# Patient Record
Sex: MALE | Race: WHITE | NOT HISPANIC OR LATINO | Employment: FULL TIME | URBAN - METROPOLITAN AREA
[De-identification: names, ages, dates, MRNs, and addresses within clinical notes are randomized per-mention and may not be internally consistent; named-entity substitution may affect disease eponyms.]

---

## 2018-10-18 ENCOUNTER — OFFICE VISIT (OUTPATIENT)
Dept: FAMILY MEDICINE | Facility: CLINIC | Age: 28
End: 2018-10-18
Payer: COMMERCIAL

## 2018-10-18 VITALS
OXYGEN SATURATION: 95 % | HEIGHT: 73 IN | WEIGHT: 315 LBS | HEART RATE: 89 BPM | DIASTOLIC BLOOD PRESSURE: 86 MMHG | SYSTOLIC BLOOD PRESSURE: 128 MMHG | BODY MASS INDEX: 41.75 KG/M2

## 2018-10-18 DIAGNOSIS — R40.0 DAYTIME SOMNOLENCE: ICD-10-CM

## 2018-10-18 DIAGNOSIS — Z00.00 ROUTINE GENERAL MEDICAL EXAMINATION AT A HEALTH CARE FACILITY: Primary | ICD-10-CM

## 2018-10-18 DIAGNOSIS — L91.8 SKIN TAG: ICD-10-CM

## 2018-10-18 DIAGNOSIS — R06.83 SNORING: ICD-10-CM

## 2018-10-18 DIAGNOSIS — E66.01 MORBID OBESITY (H): ICD-10-CM

## 2018-10-18 DIAGNOSIS — Z13.6 CARDIOVASCULAR SCREENING; LDL GOAL LESS THAN 160: ICD-10-CM

## 2018-10-18 LAB
BASOPHILS # BLD AUTO: 0 10E9/L (ref 0–0.2)
BASOPHILS NFR BLD AUTO: 0.1 %
DIFFERENTIAL METHOD BLD: ABNORMAL
EOSINOPHIL # BLD AUTO: 0.2 10E9/L (ref 0–0.7)
EOSINOPHIL NFR BLD AUTO: 2 %
ERYTHROCYTE [DISTWIDTH] IN BLOOD BY AUTOMATED COUNT: 14 % (ref 10–15)
HCT VFR BLD AUTO: 43.9 % (ref 40–53)
HGB BLD-MCNC: 14.8 G/DL (ref 13.3–17.7)
LYMPHOCYTES # BLD AUTO: 3.6 10E9/L (ref 0.8–5.3)
LYMPHOCYTES NFR BLD AUTO: 30 %
MCH RBC QN AUTO: 28.7 PG (ref 26.5–33)
MCHC RBC AUTO-ENTMCNC: 33.7 G/DL (ref 31.5–36.5)
MCV RBC AUTO: 85 FL (ref 78–100)
MONOCYTES # BLD AUTO: 1 10E9/L (ref 0–1.3)
MONOCYTES NFR BLD AUTO: 8 %
NEUTROPHILS # BLD AUTO: 7.1 10E9/L (ref 1.6–8.3)
NEUTROPHILS NFR BLD AUTO: 59.9 %
PLATELET # BLD AUTO: 229 10E9/L (ref 150–450)
RBC # BLD AUTO: 5.15 10E12/L (ref 4.4–5.9)
WBC # BLD AUTO: 11.9 10E9/L (ref 4–11)

## 2018-10-18 PROCEDURE — 99385 PREV VISIT NEW AGE 18-39: CPT | Performed by: PHYSICIAN ASSISTANT

## 2018-10-18 PROCEDURE — 80053 COMPREHEN METABOLIC PANEL: CPT | Performed by: PHYSICIAN ASSISTANT

## 2018-10-18 PROCEDURE — 84443 ASSAY THYROID STIM HORMONE: CPT | Performed by: PHYSICIAN ASSISTANT

## 2018-10-18 PROCEDURE — 36415 COLL VENOUS BLD VENIPUNCTURE: CPT | Performed by: PHYSICIAN ASSISTANT

## 2018-10-18 PROCEDURE — 83721 ASSAY OF BLOOD LIPOPROTEIN: CPT | Performed by: PHYSICIAN ASSISTANT

## 2018-10-18 PROCEDURE — 85025 COMPLETE CBC W/AUTO DIFF WBC: CPT | Performed by: PHYSICIAN ASSISTANT

## 2018-10-18 NOTE — MR AVS SNAPSHOT
After Visit Summary   10/18/2018    Zach Hunter    MRN: [id number]           Patient Information     Date Of Birth          1990        Visit Information        Provider Department      10/18/2018 4:00 PM John Richards PA-C [facility]        Today's Diagnoses     Routine general medical examination at a health care facility    -  1    Snoring        Daytime somnolence        CARDIOVASCULAR SCREENING; LDL GOAL LESS THAN 160        Skin tag          Care Instructions      Preventive Health Recommendations  Male Ages 26 - 39    Yearly exam:             See your health care provider every year in order to  o   Review health changes.   o   Discuss preventive care.    o   Review your medicines if your doctor has prescribed any.    You should be tested each year for STDs (sexually transmitted diseases), if you re at risk.     After age 35, talk to your provider about cholesterol testing. If you are at risk for heart disease, have your cholesterol tested at least every 5 years.     If you are at risk for diabetes, you should have a diabetes test (fasting glucose).  Shots: Get a flu shot each year. Get a tetanus shot every 10 years.     Nutrition:    Eat at least 5 servings of fruits and vegetables daily.     Eat whole-grain bread, whole-wheat pasta and brown rice instead of white grains and rice.     Get adequate Calcium and Vitamin D.     Lifestyle    Exercise for at least 150 minutes a week (30 minutes a day, 5 days a week). This will help you control your weight and prevent disease.     Limit alcohol to one drink per day.     No smoking.     Wear sunscreen to prevent skin cancer.     See your dentist every six months for an exam and cleaning.             Follow-ups after your visit        Additional Services     DERMATOLOGY REFERRAL       Your provider has referred you to: ANISA: [facility] [phone]  [email or web address]    Please be aware  that coverage of these services is subject to the terms and limitations of your health insurance plan.  Call member services at your health plan with any benefit or coverage questions.      Please bring the following with you to your appointment:    (1) Any X-Rays, CTs or MRIs which have been performed.  Contact the facility where they were done to arrange for  prior to your scheduled appointment.    (2) List of current medications  (3) This referral request   (4) Any documents/labs given to you for this referral            SLEEP EVALUATION & MANAGEMENT REFERRAL - [facility] [phone]  (Age 18 and up)       Please be aware that coverage of these services is subject to the terms and limitations of your health insurance plan.  Call member services at your health plan with any benefit or coverage questions.      Please bring the following to your appointment:    >>   List of current medications   >>   This referral request   >>   Any documents/labs given to you for this referral                      Future tests that were ordered for you today     Open Future Orders        Priority Expected Expires Ordered    SLEEP EVALUATION & MANAGEMENT REFERRAL - [facility] [phone]  (Age 18 and up) Routine  10/18/2019 10/18/2018            Who to contact     If you have questions or need follow up information about today's clinic visit or your schedule please contact [facility] directly at [phone].  Normal or non-critical lab and imaging results will be communicated to you by MyChart, letter or phone within 4 business days after the clinic has received the results. If you do not hear from us within 7 days, please contact the clinic through MyChart or phone. If you have a critical or abnormal lab result, we will notify you by phone as soon as possible.  Submit refill requests through [employer] or call your pharmacy and they will forward the  "refill request to us. Please allow 3 business days for your refill to be completed.          Additional Information About Your Visit        daPulsehart Information     [employer] gives you secure access to your electronic health record. If you see a primary care provider, you can also send messages to your care team and make appointments. If you have questions, please call your primary care clinic.  If you do not have a primary care provider, please call [phone] and they will assist you.        Care EveryWhere ID     This is your Care EveryWhere ID. This could be used by other organizations to access your [facility] medical records  [id number]        Your Vitals Were     Pulse Height Pulse Oximetry BMI (Body Mass Index)          89 6' 0.5\" (1.842 m) 95% 50.36 kg/m2         Blood Pressure from Last 3 Encounters:   10/18/18 128/86    Weight from Last 3 Encounters:   10/18/18 (!) 376 lb 8 oz (170.8 kg)              We Performed the Following     CBC with platelets differential     Comprehensive metabolic panel     DERMATOLOGY REFERRAL     LDL cholesterol direct     TSH with free T4 reflex        Primary Care Provider    None Specified       No primary provider on file.        Equal Access to Services     [facility]: Hadii lindsay Hutchinson, waaxda elicia, qaybta kaalshabana gloria, kailee cervantes . So [facility] [phone].    ATENCIÓN: Si habla español, tiene a chase disposición servicios gratuitos de asistencia lingüística. Llame al [phone].    We comply with applicable federal civil rights laws and [place] laws. We do not discriminate on the basis of race, color, national origin, age, disability, sex, sexual orientation, or gender identity.            Thank you!     Thank you for choosing [facility]  for your care. Our goal is always to provide you with excellent care. Hearing back from our patients is one way we can continue to improve our services. Please take a few " minutes to complete the written survey that you may receive in the mail after your visit with us. Thank you!             Your Updated Medication List - Protect others around you: Learn how to safely use, store and throw away your medicines at www.disposemymeds.org.      Notice  As of 10/18/2018  4:41 PM    You have not been prescribed any medications.

## 2018-10-18 NOTE — NURSING NOTE
"Chief Complaint   Patient presents with     Physical     establish care, discuss some gi issues, occasional blood in stool, previously has taken miralax with improved sx,      /89  Pulse 89  Ht 6' 0.5\" (1.842 m)  Wt (!) 376 lb 8 oz (170.8 kg)  SpO2 95%  BMI 50.36 kg/m2 Estimated body mass index is 50.36 kg/(m^2) as calculated from the following:    Height as of this encounter: 6' 0.5\" (1.842 m).    Weight as of this encounter: 376 lb 8 oz (170.8 kg).        Health Maintenance due pending provider review:  NONE    n/a    Sherita Hahn CMA  "

## 2018-10-18 NOTE — PROGRESS NOTES
SUBJECTIVE:   CC: Zach Hunter is an 28 year old male who presents for preventative health visit.     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  He recently moved to [facility] from [place].  He has really started to change some lifestyle choices.  He has quit smoking since this summer.  He has been exercising and eating much better.  He is feeling better than he has in while.  He has lost some weight, not sure how much, but clothes fitting different.      He is a bit concerns about sleep apnea, does snore and daytime energy is not great.  Would like to get checked for that.      Numerous skin tags around neck line that do bother him.  Interested if these can be removed.    Hx of bright red blood in stool, did have GI study done.  Was planning on getting colonscopy, but never did.  miralax does help.  Has not had too much of an issues lately with his diet changes and exercising.          Today's PHQ-2 Score:   PHQ-2 ( 1999 [employer]) 10/17/2018   Q1: Little interest or pleasure in doing things 0   Q2: Feeling down, depressed or hopeless 0   PHQ-2 Score 0   Q1: Little interest or pleasure in doing things Not at all   Q2: Feeling down, depressed or hopeless Not at all   PHQ-2 Score 0       Abuse: Current or Past(Physical, Sexual or Emotional)- NO  Do you feel safe in your environment - YES    Social History   Substance Use Topics     Smoking status: Former Smoker     Packs/day: 0.50     Years: 10.00     Types: Cigarettes, Cigars, Hookah     Start date: 8/1/2007     Quit date: 6/1/2018     Smokeless  "tobacco: Never Used     Alcohol use Yes      Comment: Casual, 1-2 per week with dinner     Alcohol Use 10/17/2018   If you drink alcohol do you typically have greater than 3 drinks per day OR greater than 7 drinks per week? No   No flowsheet data found.    Last PSA: No results found for: PSA    Reviewed orders with patient. Reviewed health maintenance and updated orders accordingly - Yes  BP Readings from Last 3 Encounters:   10/18/18 128/86    Wt Readings from Last 3 Encounters:   10/18/18 (!) 376 lb 8 oz (170.8 kg)                    Reviewed and updated as needed this visit by clinical staff  Tobacco  Allergies  Meds         Reviewed and updated as needed this visit by Provider            Review of Systems  CONSTITUTIONAL: NEGATIVE for fever, chills, change in weight  INTEGUMENTARY/SKIN: skin tags  EYES: NEGATIVE for vision changes or irritation  ENT: NEGATIVE for ear, mouth and throat problems  RESP: NEGATIVE for significant cough or SOB  CV: NEGATIVE for chest pain, palpitations or peripheral edema  GI: NEGATIVE for nausea, abdominal pain, heartburn, or change in bowel habits   male: negative for dysuria, hematuria, decreased urinary stream, erectile dysfunction, urethral discharge  MUSCULOSKELETAL: NEGATIVE for significant arthralgias or myalgia  NEURO: NEGATIVE for weakness, dizziness or paresthesias  PSYCHIATRIC: NEGATIVE for changes in mood or affect    OBJECTIVE:   /86  Pulse 89  Ht 6' 0.5\" (1.842 m)  Wt (!) 376 lb 8 oz (170.8 kg)  SpO2 95%  BMI 50.36 kg/m2    Physical Exam  GENERAL: alert, no distress and obese  EYES: Eyes grossly normal to inspection, PERRL and conjunctivae and sclerae normal  HENT: ear canals and TM's normal, nose and mouth without ulcers or lesions  NECK: no adenopathy, no asymmetry, masses, or scars and thyroid normal to palpation  RESP: lungs clear to auscultation - no rales, rhonchi or wheezes  CV: regular rate and rhythm, normal S1 S2, no S3 or S4, no murmur, click " "or rub, no peripheral edema and peripheral pulses strong  ABDOMEN: obese, non tender.  Difficult exam secondary to habitus.    MS: no gross musculoskeletal defects noted, no edema  SKIN: numerous skin tags around neck line, some rather large.  NEURO: Normal strength and tone, mentation intact and speech normal  PSYCH: mentation appears normal, affect normal/bright    Diagnostic Test Results:  none     ASSESSMENT/PLAN:   1. Routine general medical examination at a health care facility  Encouraged by some of the changes he has already made.  Will get some baseline labs.  - Comprehensive metabolic panel  - CBC with platelets differential  - TSH with free T4 reflex    2. Snoring  Risk factors/  - SLEEP EVALUATION & MANAGEMENT REFERRAL - [facility] [phone]  (Age 18 and up); Future    3. Morbid obesity (H)  Improving, just needs to keep up with those changes.  He feels confident.    4. Daytime somnolence    - SLEEP EVALUATION & MANAGEMENT REFERRAL - [facility] [phone]  (Age 18 and up); Future    5. Skin tag    - DERMATOLOGY REFERRAL    6. CARDIOVASCULAR SCREENING; LDL GOAL LESS THAN 160    - LDL cholesterol direct    COUNSELING:   Reviewed preventive health counseling, as reflected in patient instructions       Regular exercise       Healthy diet/nutrition    BP Readings from Last 1 Encounters:   10/18/18 128/86     Estimated body mass index is 50.36 kg/(m^2) as calculated from the following:    Height as of this encounter: 6' 0.5\" (1.842 m).    Weight as of this encounter: 376 lb 8 oz (170.8 kg).    BP Screening:   Last 3 BP Readings:    BP Readings from Last 3 Encounters:   10/18/18 128/86       The following was recommended to the patient:  Re-screen BP within a year and recommended lifestyle modifications  Weight management plan: Discussed healthy diet and exercise guidelines and patient will follow up in 12 months in clinic to re-evaluate.     " reports that he quit smoking about 4 months ago. His smoking use included Cigarettes, Cigars, and Hookah. He started smoking about 11 years ago. He has a 5.00 pack-year smoking history. He has never used smokeless tobacco.      Counseling Resources:  ATP IV Guidelines  Pooled Cohorts Equation Calculator  FRAX Risk Assessment  ICSI Preventive Guidelines  Dietary Guidelines for Americans, 2010  USDA's MyPlate  ASA Prophylaxis  Lung CA Screening    John Richards PA-C  [facility]

## 2018-10-19 PROBLEM — Z13.6 CARDIOVASCULAR SCREENING; LDL GOAL LESS THAN 160: Status: ACTIVE | Noted: 2018-10-19

## 2018-10-19 LAB
ALBUMIN SERPL-MCNC: 4.1 G/DL (ref 3.4–5)
ALP SERPL-CCNC: 71 U/L (ref 40–150)
ALT SERPL W P-5'-P-CCNC: 73 U/L (ref 0–70)
ANION GAP SERPL CALCULATED.3IONS-SCNC: 8 MMOL/L (ref 3–14)
AST SERPL W P-5'-P-CCNC: 33 U/L (ref 0–45)
BILIRUB SERPL-MCNC: 0.3 MG/DL (ref 0.2–1.3)
BUN SERPL-MCNC: 16 MG/DL (ref 7–30)
CALCIUM SERPL-MCNC: 8.7 MG/DL (ref 8.5–10.1)
CHLORIDE SERPL-SCNC: 104 MMOL/L (ref 94–109)
CO2 SERPL-SCNC: 27 MMOL/L (ref 20–32)
CREAT SERPL-MCNC: 1.04 MG/DL (ref 0.66–1.25)
GFR SERPL CREATININE-BSD FRML MDRD: 85 ML/MIN/1.7M2
GLUCOSE SERPL-MCNC: 71 MG/DL (ref 70–99)
LDLC SERPL DIRECT ASSAY-MCNC: 116 MG/DL
POTASSIUM SERPL-SCNC: 3.8 MMOL/L (ref 3.4–5.3)
PROT SERPL-MCNC: 8.1 G/DL (ref 6.8–8.8)
SODIUM SERPL-SCNC: 139 MMOL/L (ref 133–144)
TSH SERPL DL<=0.005 MIU/L-ACNC: 1.58 MU/L (ref 0.4–4)

## 2018-10-22 NOTE — PROGRESS NOTES
Dear Zach    Your test results are attached, feel free to contact me via [employer] .    Overall, your labs look good.  Cholesterol we want to see below 130, which you are.  Very slight bump in ALT.  This is a liver enzyme.  This is very small and is most likely to having extra weight on you.  This will improve as you continue to get healthy.  I would not change anything based on these results.  Keep up the good work.      Jorge Richards PA-C

## 2019-02-18 ENCOUNTER — OFFICE VISIT (OUTPATIENT)
Dept: DERMATOLOGY | Facility: CLINIC | Age: 29
End: 2019-02-18
Payer: COMMERCIAL

## 2019-02-18 ENCOUNTER — OFFICE VISIT (OUTPATIENT)
Dept: SLEEP MEDICINE | Facility: CLINIC | Age: 29
End: 2019-02-18
Payer: COMMERCIAL

## 2019-02-18 VITALS — HEART RATE: 103 BPM | OXYGEN SATURATION: 97 % | SYSTOLIC BLOOD PRESSURE: 122 MMHG | DIASTOLIC BLOOD PRESSURE: 76 MMHG

## 2019-02-18 VITALS
HEIGHT: 73 IN | RESPIRATION RATE: 18 BRPM | WEIGHT: 315 LBS | OXYGEN SATURATION: 95 % | DIASTOLIC BLOOD PRESSURE: 85 MMHG | SYSTOLIC BLOOD PRESSURE: 124 MMHG | BODY MASS INDEX: 41.75 KG/M2 | HEART RATE: 90 BPM | TEMPERATURE: 98.1 F

## 2019-02-18 DIAGNOSIS — L91.8 INFLAMED SKIN TAG: Primary | ICD-10-CM

## 2019-02-18 DIAGNOSIS — R29.818 SUSPECTED SLEEP APNEA: Primary | ICD-10-CM

## 2019-02-18 DIAGNOSIS — R06.83 SNORING: ICD-10-CM

## 2019-02-18 DIAGNOSIS — G47.19 EXCESSIVE DAYTIME SLEEPINESS: ICD-10-CM

## 2019-02-18 DIAGNOSIS — R06.81 WITNESSED APNEIC SPELLS: ICD-10-CM

## 2019-02-18 PROCEDURE — 11201 RMVL SKIN TAGS EA ADDL 10: CPT | Performed by: PHYSICIAN ASSISTANT

## 2019-02-18 PROCEDURE — 99203 OFFICE O/P NEW LOW 30 MIN: CPT | Mod: 25 | Performed by: INTERNAL MEDICINE

## 2019-02-18 PROCEDURE — 99213 OFFICE O/P EST LOW 20 MIN: CPT | Mod: 25 | Performed by: PHYSICIAN ASSISTANT

## 2019-02-18 PROCEDURE — 11200 RMVL SKIN TAGS UP TO&INC 15: CPT | Performed by: PHYSICIAN ASSISTANT

## 2019-02-18 ASSESSMENT — MIFFLIN-ST. JEOR: SCORE: 2756.62

## 2019-02-18 NOTE — LETTER
2019         RE: Zach Hunter  [address]  [place] [address]        Dear Colleague,    Thank you for referring your patient, Zach Hunter, to the [facility]. Please see a copy of my visit note below.    HPI:  Zach Hunter is a 28 year old male patient here today for irritated growths on neckline and left eyelid .  Patient states this has been present for a while.  Patient reports the following symptoms: lesion on eyelid feels heavy and within visual field .  Patient reports the following previous treatments: none.  Patient reports the following modifying factors: none.  Associated symptoms: none. Had similar lesions snipped off in the past.  Patient has no other skin complaints today.  Remainder of the HPI, Meds, PMH, Allergies, FH, and SH was reviewed in chart.    Pertinent Hx:   No personal or family history of skin cancer    History reviewed. No pertinent past medical history.    History reviewed. No pertinent surgical history.     Family History   Problem Relation Age of Onset     Colon Cancer Father         Haven't been in contact since ~; unsure of current status       Social History     Socioeconomic History     Marital status: Single     Spouse name: Not on file     Number of children: Not on file     Years of education: Not on file     Highest education level: Not on file   Social Needs     Financial resource strain: Not on file     Food insecurity - worry: Not on file     Food insecurity - inability: Not on file     Transportation needs - medical: Not on file     Transportation needs - non-medical: Not on file   Occupational History     Not on file   Tobacco Use     Smoking status: Former Smoker     Packs/day: 0.50     Years: 10.00     Pack years: 5.00     Types: Cigarettes, Cigars, Hookah     Start date: 2007     Last attempt to quit: 2018     Years since quittin.7     Smokeless tobacco: Never Used   Substance and Sexual  Activity     Alcohol use: Yes     Comment: Casual, 1-2 per week with dinner     Drug use: Yes     Types: Marijuana     Comment: Recreational use     Sexual activity: Yes     Partners: Female     Birth control/protection: IUD   Other Topics Concern     Parent/sibling w/ CABG, MI or angioplasty before 65F 55M? No   Social History Narrative     Not on file       No outpatient encounter medications on file as of 2/18/2019.     No facility-administered encounter medications on file as of 2/18/2019.        Review Of Systems:  Skin: As above  Eyes: negative  Ears/Nose/Throat: negative  Respiratory: No shortness of breath, dyspnea on exertion, cough, or hemoptysis  Cardiovascular: negative  Gastrointestinal: negative  Genitourinary: negative  Musculoskeletal: negative  Neurologic: negative  Psychiatric: negative  Hematologic/Lymphatic/Immunologic: negative  Endocrine: negative      Objective:     /76   Pulse 103   SpO2 97%   Eyes: Conjunctivae/lids: Normal   ENT: Lips:  Normal  MSK: Normal  Cardiovascular: Peripheral edema none  Pulm: Breathing Normal  Neuro/Psych: Orientation: Normal; Mood/Affect: Normal, NAD, WDWN  Pt accompanied by: self  Following areas examined: face, neck, hands  Corcoran skin type:i   Findings:  Inflamed flesh-colored smooth pedunculated papules on neck and left superior eyelid  Assessment and Plan:  1) inflamed skin tags x 20  Disc etiology and course  SNIP REMOVAL: After consent, anesthesia with LEC and prep, snip excision performed.  No complications and routine wound care.  May grow back and will get a scar. Wound care directions given.    Proper skin care from [facility]:    -Eliminate harsh soaps as they strip the natural oils from the skin, often resulting in dry itchy skin ( i.e. Dial, Zest, Danish Spring)  -Use mild soaps such as Cetaphil or Dove Sensitive Skin in the shower. You do not need to use soap on arms, legs, and trunk every time you shower unless visibly soiled.    -Avoid hot or cold showers.  -After showering, lightly dry off and apply moisturizing within 2-3 minutes. This will help trap moisture in the skin.   -Aggressive use of a moisturizer at least 1-2 times a day to the entire body (including -Vanicream, Cetaphil, Aquaphor or Cerave) and moisturize hands after every washing.  -We recommend using moisturizers that come in a tub that needs to be scooped out, not a pump. This has more of an oil base. It will hold moisture in your skin much better than a water base moisturizer. The above recommended are non-pore clogging.               Follow up in prn      Again, thank you for allowing me to participate in the care of your patient.        Sincerely,        Leena Mackenzie PA-C

## 2019-02-18 NOTE — PROGRESS NOTES
Sleep Consultation:    Date on this visit: 2/18/2019    Zach Hunter is sent by No ref. provider found for a sleep consultation regarding possible sleep apnea.    Primary Physician: John Richards     Chief complaints: snoring, witnessed apneas     Presenting History:     Zach Hunter reports nightly snoring and gasping for last 5 years.     Zach does snore every night. Snoring is loud and can be heard outside the room. Patient does have a regular bed partner. There is report of snoring, gasping and snorting.  He does have witnessed apneas. They never sleep separately.  Patient sleeps on his side. He has frequent morning dry mouth, denies no morning headaches and restless legs. Zach denies any bruxism, sleep walking, sleep talking, sleep paralysis, cataplexy and hypnogogic/hypnopompic hallucinations.     Zach goes to sleep at 11:00 PM during the week. He wakes up at 7:00 AM without an alarm. He falls asleep in 20 minutes.  Zach denies difficulty falling asleep.  He wakes up 2-4 times a night for 15 minutes before falling back to sleep.  Zach wakes up to go to the bathroom and uncertain reasons.  On weekends, Zach goes to sleep at 12:00 AM.  He wakes up at 9:00 AM without an alarm. He falls asleep in 20 minutes.  Patient gets an average of 7 hours of sleep per night.     Patient does use electronics in bed.     Zach does not do shift work.      Zach denies difficulty breathing through his nose.      Patient's [place] Sleepiness score 11/24 consistent with mild daytime sleepiness.      Zach naps 0 times per week. He takes some inadvertant naps.  He denies closing eyes, dozing and falling asleep while driving. Patient was counseled on the importance of driving while alert, to pull over if drowsy, or nap before getting into the vehicle if sleepy.      He uses 4 cups/day of coffee. Last caffeine intake is usually before 2 pm.    Allergies:    No Known  Allergies    Medications:    No current outpatient medications on file.       Problem List:  Patient Active Problem List    Diagnosis Date Noted     CARDIOVASCULAR SCREENING; LDL GOAL LESS THAN 160 10/19/2018     Priority: Medium     Morbid obesity (H) 10/18/2018     Priority: Medium        Past Medical/Surgical History:    - Nothing significant.     Social History:  Social History     Socioeconomic History     Marital status: Single     Spouse name: Not on file     Number of children: Not on file     Years of education: Not on file     Highest education level: Not on file   Social Needs     Financial resource strain: Not on file     Food insecurity - worry: Not on file     Food insecurity - inability: Not on file     Transportation needs - medical: Not on file     Transportation needs - non-medical: Not on file   Occupational History     Not on file   Tobacco Use     Smoking status: Former Smoker     Packs/day: 0.50     Years: 10.00     Pack years: 5.00     Types: Cigarettes, Cigars, Hookah     Start date: 2007     Last attempt to quit: 2018     Years since quittin.7     Smokeless tobacco: Never Used   Substance and Sexual Activity     Alcohol use: Yes     Comment: Casual, 1-2 per week with dinner     Drug use: Yes     Types: Marijuana     Comment: Recreational use     Sexual activity: Yes     Partners: Female     Birth control/protection: IUD   Other Topics Concern     Parent/sibling w/ CABG, MI or angioplasty before 65F 55M? No   Social History Narrative     Not on file       Family History:  Family History   Problem Relation Age of Onset     Colon Cancer Father         Haven't been in contact since ~; unsure of current status       Review of Systems:  A complete review of systems reviewed by me is negative with the exeption of what has been mentioned in the history of present illness.  CONSTITUTIONAL: NEGATIVE for weight gain/loss, fever, chills, sweats or night sweats, drug allergies.  EYES:  "NEGATIVE for changes in vision, blind spots, double vision.  ENT: NEGATIVE for ear pain, sore throat, sinus pain, post-nasal drip, runny nose, bloody nose  CARDIAC: NEGATIVE for fast heartbeats or fluttering in chest, chest pain or pressure, breathlessness when lying flat, swollen legs or swollen feet.  NEUROLOGIC: NEGATIVE headaches, weakness or numbness in the arms or legs.  DERMATOLOGIC: NEGATIVE for rashes, new moles or change in mole(s)  PULMONARY:  POSITIVE for  SOB with activity  GASTROINTESTINAL:  POSITIVE for  loose or watery stools  GENITOURINARY: NEGATIVE for pain during urination, blood in urine, urinating more frequently than usual, irregular menstrual periods.  MUSCULOSKELETAL: NEGATIVE for muscle pain, bone or joint pain, swollen joints.  ENDOCRINE: NEGATIVE for increased thirst or urination, diabetes.  LYMPHATIC: NEGATIVE for swollen lymph nodes, lumps or bumps in the breasts or nipple discharge.    Physical Examination:  Vitals: /85   Pulse 90   Temp 98.1  F (36.7  C) (Oral)   Resp 18   Ht 1.854 m (6' 1\")   Wt (!) 173.3 kg (382 lb)   SpO2 95%   BMI 50.40 kg/m    BMI= Body mass index is 50.4 kg/m .    Neck Cir (cm): 54 cm    [place] Total Score 2/18/2019   Total score - [place] 11       SERG Total Score: 17 (02/18/19 0956)    GENERAL APPEARANCE: healthy, alert and no distress  EYES: Eyes grossly normal to inspection, PERRL and conjunctivae and sclerae normal  HENT: nose and mouth without ulcers or lesions, oropharynx crowded, uvula elongated, soft palate dependent and tongue base enlarged  NECK: no adenopathy, no asymmetry, masses, or scars and thyroid normal to palpation  RESP: lungs clear to auscultation - no rales, rhonchi or wheezes  CV: regular rates and rhythm, normal S1 S2, no S3 or S4 and no murmur, click or rub  ABDOMEN: soft, nontender, without hepatosplenomegaly or masses and obese  MS: extremities normal- no gross deformities noted  NEURO: Normal strength and tone, mentation " intact and speech normal  PSYCH: mentation appears normal and affect normal/bright  Mallampati Class: IV.  Tonsillar Stage: 1  hidden by pillars.    Impression/Plan:    1. Probable obstructive sleep apnea  2. Snoring   3. Witnessed apneas  4. Sleepiness  5. Obesity     - Patient, 29 yo male with BMI 50, neck circumference 54 cm, with history of snoring, witnessed apneas and daytime sleepiness is at a high risk for sleep apnea. He doesnot have significant medical comorbidity. An overnight sleep study is recommended for assessment of sleep apnea and treatment determination.     Plan:    1. Home sleep apnea test       Literature provided regarding sleep apnea.      He will follow up with me in approximately two weeks after his sleep study has been competed to review the results and discuss plan of care.       Polysomnography & HST reviewed.  Limitations of HST reviewed.   Obstructive sleep apnea reviewed.  Complications of untreated sleep apnea were reviewed.    I spent a total of 30 minutes with patient with more than 50% in counseling     Dr. Haseeb Wu     CC: No ref. provider found

## 2019-02-18 NOTE — NURSING NOTE
"Chief Complaint   Patient presents with     Sleep Problem     Loud snoring, concern of sleep apnea, waking up 3-4 times per night       Initial /85   Pulse 90   Temp 98.1  F (36.7  C) (Oral)   Resp 18   Ht 1.854 m (6' 1\")   Wt (!) 173.3 kg (382 lb)   SpO2 95%   BMI 50.40 kg/m   Estimated body mass index is 50.4 kg/m  as calculated from the following:    Height as of this encounter: 1.854 m (6' 1\").    Weight as of this encounter: 173.3 kg (382 lb).    Medication Reconciliation: complete    Neck circumference: 21 inches / 53 centimeters.    ESS 11    Emmanuelle Allen MA      "

## 2019-02-18 NOTE — PATIENT INSTRUCTIONS
Your BMI is Body mass index is 50.4 kg/m .  Weight management is a personal decision.  If you are interested in exploring weight loss strategies, the following discussion covers the approaches that may be successful. Body mass index (BMI) is one way to tell whether you are at a healthy weight, overweight, or obese. It measures your weight in relation to your height.  A BMI of 18.5 to 24.9 is in the healthy range. A person with a BMI of 25 to 29.9 is considered overweight, and someone with a BMI of 30 or greater is considered obese. More than two-thirds of American adults are considered overweight or obese.  Being overweight or obese increases the risk for further weight gain. Excess weight may lead to heart disease and diabetes.  Creating and following plans for healthy eating and physical activity may help you improve your health.  Weight control is part of healthy lifestyle and includes exercise, emotional health, and healthy eating habits. Careful eating habits lifelong are the mainstay of weight control. Though there are significant health benefits from weight loss, long-term weight loss with diet alone may be very difficult to achieve- studies show long-term success with dietary management in less than 10% of people. Attaining a healthy weight may be especially difficult to achieve in those with severe obesity. In some cases, medications, devices and surgical management might be considered.  What can you do?  If you are overweight or obese and are interested in methods for weight loss, you should discuss this with your provider.     Consider reducing daily calorie intake by 500 calories.     Keep a food journal.     Avoiding skipping meals, consider cutting portions instead.    Diet combined with exercise helps maintain muscle while optimizing fat loss. Strength training is particularly important for building and maintaining muscle mass. Exercise helps reduce stress, increase energy, and improves fitness.  Increasing exercise without diet control, however, may not burn enough calories to loose weight.       Start walking three days a week 10-20 minutes at a time    Work towards walking thirty minutes five days a week     Eventually, increase the speed of your walking for 1-2 minutes at time    In addition, we recommend that you review healthy lifestyles and methods for weight loss available through the National Institutes of Health patient information sites:  http://win.niddk.nih.gov/publications/index.htm    And look into health and wellness programs that may be available through your health insurance provider, employer, local community center, or margarita club.    Weight management plan: Patient was referred to their PCP to discuss a diet and exercise plan.

## 2019-02-18 NOTE — PATIENT INSTRUCTIONS
Proper skin care from [facility]:    -Eliminate harsh soaps as they strip the natural oils from the skin, often resulting in dry itchy skin ( i.e. Dial, Zest, [place])  -Use mild soaps such as Cetaphil or Dove Sensitive Skin in the shower. You do not need to use soap on arms, legs, and trunk every time you shower unless visibly soiled.   -Avoid hot or cold showers.  -After showering, lightly dry off and apply moisturizing within 2-3 minutes. This will help trap moisture in the skin.   -Aggressive use of a moisturizer at least 1-2 times a day to the entire body (including -Vanicream, Cetaphil, Aquaphor or Cerave) and moisturize hands after every washing.  -We recommend using moisturizers that come in a tub that needs to be scooped out, not a pump. This has more of an oil base. It will hold moisture in your skin much better than a water base moisturizer. The above recommended are non-pore clogging.           Wear a sunscreen with at least SPF 30 on your face, ears, neck and V of the chest daily. Wear sunscreen on other areas of the body if those areas are exposed to the sun throughout the day. Sunscreens can contain physical and/or chemical blockers. Physical blockers are less likely to clog pores, these include zinc oxide and titanium dioxide. Reapply every two hour and after swimming. Sunscreen examples include Neutrogena, CeraVe, Blue Lizard, Elta MD and many others.    UV radiation  UVA radiation remains constant throughout the day and throughout the year. It is a longer wavelength than UVB and therefore penetrates deeper into the skin leading to immediate and delayed tanning, photoaging, and skin cancer. 70-80% of UVA and UVB radiation occurs between the hours of 10am-2pm.  UVB radiation  UVB radiation causes the most harmful effects and is more significant during the summer months. However, snow and ice can reflect UVB radiation leading to skin damage during the winter months as well. UVB  radiation is responsible for tanning, burning, inflammation, delayed erythema (pinkness), pigmentation (brown spots), and skin cancer.   Just because you do not burn or are not developing a tan does not mean that you are not damaging your skin. A 15 minute drive to and from work for 30 years an lead to chronic sun damage of the skin. It is important to wear a broad spectrum (both UVA and UVB) sunscreen EVERY day with at least 30 SPF. Apply to face, ears, neck and v of the chest as this is where most of our sun exposure is. Reapply sunscreen every two hours if you plan on being outside.   Imer Khan. Clinical Dermatology: A Color Guide to Diagnosis and Therapy. Elsevier, 2016.                  Wound Care Instructions     FOR SUPERFICIAL WOUNDS     [facility] [phone]    [facility] [phone]          AFTER 24 HOURS YOU SHOULD REMOVE THE BANDAGE AND BEGIN DAILY DRESSING CHANGES AS FOLLOWS:     1) Remove Dressing.     2) Clean and dry the area with tap water using a Q-tip or sterile gauze pad.     3) Apply Vaseline, Aquaphor, Polysporin ointment or Bacitracin ointment over entire wound.  Do NOT use Neosporin ointment.     4) Cover the wound with a band-aid, or a sterile non-stick gauze pad and micropore paper tape      REPEAT THESE INSTRUCTIONS AT LEAST ONCE A DAY UNTIL THE WOUND HAS COMPLETELY HEALED.    It is an old wives tale that a wound heals better when it is exposed to air and allowed to dry out. The wound will heal faster with a better cosmetic result if it is kept moist with ointment and covered with a bandage.    **Do not let the wound dry out.**      Supplies Needed:      *Cotton tipped applicators (Q-tips)    *Polysporin Ointment or Bacitracin Ointment (NOT NEOSPORIN)    *Band-aids or non-stick gauze pads and micropore paper tape.      PATIENT INFORMATION:    During the healing process you will notice a number of changes. All wounds develop a small halo of redness  surrounding the wound.  This means healing is occurring. Severe itching with extensive redness usually indicates sensitivity to the ointment or bandage tape used to dress the wound.  You should call our office if this develops.      Swelling  and/or discoloration around your surgical site is common, particularly when performed around the eye.    All wounds normally drain.  The larger the wound the more drainage there will be.  After 7-10 days, you will notice the wound beginning to shrink and new skin will begin to grow.  The wound is healed when you can see skin has formed over the entire area.  A healed wound has a healthy, shiny look to the surface and is red to dark pink in color to normalize.  Wounds may take approximately 4-6 weeks to heal.  Larger wounds may take 6-8 weeks.  After the wound is healed you may discontinue dressing changes.    You may experience a sensation of tightness as your wound heals. This is normal and will gradually subside.    Your healed wound may be sensitive to temperature changes. This sensitivity improves with time, but if you re having a lot of discomfort, try to avoid temperature extremes.    Patients frequently experience itching after their wound appears to have healed because of the continue healing under the skin.  Plain Vaseline will help relieve the itching.        POSSIBLE COMPLICATIONS    BLEEDIN. Leave the bandage in place.  2. Use tightly rolled up gauze or a cloth to apply direct pressure over the bandage for 30  minutes.  3. Reapply pressure for an additional 30 minutes if necessary  4. Use additional gauze and tape to maintain pressure once the bleeding has stopped.

## 2019-02-19 NOTE — PROGRESS NOTES
HPI:  Zach Hunter is a 28 year old male patient here today for irritated growths on neckline and left eyelid .  Patient states this has been present for a while.  Patient reports the following symptoms: lesion on eyelid feels heavy and within visual field .  Patient reports the following previous treatments: none.  Patient reports the following modifying factors: none.  Associated symptoms: none. Had similar lesions snipped off in the past.  Patient has no other skin complaints today.  Remainder of the HPI, Meds, PMH, Allergies, FH, and SH was reviewed in chart.    Pertinent Hx:   No personal or family history of skin cancer    History reviewed. No pertinent past medical history.    History reviewed. No pertinent surgical history.     Family History   Problem Relation Age of Onset     Colon Cancer Father         Haven't been in contact since ~; unsure of current status       Social History     Socioeconomic History     Marital status: Single     Spouse name: Not on file     Number of children: Not on file     Years of education: Not on file     Highest education level: Not on file   Social Needs     Financial resource strain: Not on file     Food insecurity - worry: Not on file     Food insecurity - inability: Not on file     Transportation needs - medical: Not on file     Transportation needs - non-medical: Not on file   Occupational History     Not on file   Tobacco Use     Smoking status: Former Smoker     Packs/day: 0.50     Years: 10.00     Pack years: 5.00     Types: Cigarettes, Cigars, Hookah     Start date: 2007     Last attempt to quit: 2018     Years since quittin.7     Smokeless tobacco: Never Used   Substance and Sexual Activity     Alcohol use: Yes     Comment: Casual, 1-2 per week with dinner     Drug use: Yes     Types: Marijuana     Comment: Recreational use     Sexual activity: Yes     Partners: Female     Birth control/protection: IUD   Other Topics Concern     Parent/sibling w/  CABG, MI or angioplasty before 65F 55M? No   Social History Narrative     Not on file       No outpatient encounter medications on file as of 2/18/2019.     No facility-administered encounter medications on file as of 2/18/2019.        Review Of Systems:  Skin: As above  Eyes: negative  Ears/Nose/Throat: negative  Respiratory: No shortness of breath, dyspnea on exertion, cough, or hemoptysis  Cardiovascular: negative  Gastrointestinal: negative  Genitourinary: negative  Musculoskeletal: negative  Neurologic: negative  Psychiatric: negative  Hematologic/Lymphatic/Immunologic: negative  Endocrine: negative      Objective:     /76   Pulse 103   SpO2 97%   Eyes: Conjunctivae/lids: Normal   ENT: Lips:  Normal  MSK: Normal  Cardiovascular: Peripheral edema none  Pulm: Breathing Normal  Neuro/Psych: Orientation: Normal; Mood/Affect: Normal, NAD, WDWN  Pt accompanied by: self  Following areas examined: face, neck, hands  [place] skin type:i   Findings:  Inflamed flesh-colored smooth pedunculated papules on neck and left superior eyelid  Assessment and Plan:  1) inflamed skin tags x 20  Disc etiology and course  SNIP REMOVAL: After consent, anesthesia with LEC and prep, snip excision performed.  No complications and routine wound care.  May grow back and will get a scar. Wound care directions given.    Proper skin care from [facility]:    -Eliminate harsh soaps as they strip the natural oils from the skin, often resulting in dry itchy skin ( i.e. Dial, Zest, [place])  -Use mild soaps such as Cetaphil or Dove Sensitive Skin in the shower. You do not need to use soap on arms, legs, and trunk every time you shower unless visibly soiled.   -Avoid hot or cold showers.  -After showering, lightly dry off and apply moisturizing within 2-3 minutes. This will help trap moisture in the skin.   -Aggressive use of a moisturizer at least 1-2 times a day to the entire body (including -Vanicream, Cetaphil,  Aquaphor or Cerave) and moisturize hands after every washing.  -We recommend using moisturizers that come in a tub that needs to be scooped out, not a pump. This has more of an oil base. It will hold moisture in your skin much better than a water base moisturizer. The above recommended are non-pore clogging.               Follow up in prn

## 2019-03-18 ENCOUNTER — OFFICE VISIT (OUTPATIENT)
Dept: SLEEP MEDICINE | Facility: CLINIC | Age: 29
End: 2019-03-18
Attending: INTERNAL MEDICINE
Payer: COMMERCIAL

## 2019-03-18 DIAGNOSIS — G47.19 EXCESSIVE DAYTIME SLEEPINESS: ICD-10-CM

## 2019-03-18 DIAGNOSIS — R29.818 SUSPECTED SLEEP APNEA: ICD-10-CM

## 2019-03-18 DIAGNOSIS — R06.83 SNORING: ICD-10-CM

## 2019-03-18 DIAGNOSIS — R06.81 WITNESSED APNEIC SPELLS: ICD-10-CM

## 2019-03-19 ENCOUNTER — DOCUMENTATION ONLY (OUTPATIENT)
Dept: SLEEP MEDICINE | Facility: CLINIC | Age: 29
End: 2019-03-19
Payer: COMMERCIAL

## 2019-03-19 ENCOUNTER — TELEPHONE (OUTPATIENT)
Dept: SLEEP MEDICINE | Facility: CLINIC | Age: 29
End: 2019-03-19

## 2019-03-19 NOTE — TELEPHONE ENCOUNTER
Tried to leave a voice mail to REDO his HST. Patient's mail box is full. Would like him to REDO his HST as soon as possible.    Farideh Beaulieu

## 2019-03-19 NOTE — PROGRESS NOTES
HST failed. Patient notified and will repeat test. Charges have been removed.    Loly [place]  [facility] - Specialist

## 2019-03-19 NOTE — NURSING NOTE
HST failed. Patient notified and will repeat test. Charges have been removed.    Loly [place]  [facility] - Specialist

## 2019-03-20 ENCOUNTER — OFFICE VISIT (OUTPATIENT)
Dept: SLEEP MEDICINE | Facility: CLINIC | Age: 29
End: 2019-03-20
Payer: COMMERCIAL

## 2019-03-20 DIAGNOSIS — G47.34 HYPOXIA, SLEEP RELATED: ICD-10-CM

## 2019-03-20 DIAGNOSIS — R06.83 SNORING: Primary | ICD-10-CM

## 2019-03-20 DIAGNOSIS — G47.33 OSA (OBSTRUCTIVE SLEEP APNEA): ICD-10-CM

## 2019-03-20 PROCEDURE — G0399 HOME SLEEP TEST/TYPE 3 PORTA: HCPCS | Performed by: INTERNAL MEDICINE

## 2019-03-21 ENCOUNTER — TELEPHONE (OUTPATIENT)
Dept: SLEEP MEDICINE | Facility: CLINIC | Age: 29
End: 2019-03-21

## 2019-03-21 ENCOUNTER — DOCUMENTATION ONLY (OUTPATIENT)
Dept: SLEEP MEDICINE | Facility: CLINIC | Age: 29
End: 2019-03-21
Payer: COMMERCIAL

## 2019-03-21 DIAGNOSIS — G47.33 OSA (OBSTRUCTIVE SLEEP APNEA): Primary | ICD-10-CM

## 2019-03-21 DIAGNOSIS — G47.34 SLEEP-RELATED HYPOXIA: ICD-10-CM

## 2019-03-21 DIAGNOSIS — E66.01 OBESITY, MORBID, BMI 50 OR HIGHER (H): ICD-10-CM

## 2019-03-21 NOTE — TELEPHONE ENCOUNTER
Left a voice mail for patient to call and schedule an in lab study.    Dr. Wu put orders in for this patient to schedule an in lab sleep study. I have already contacted Sun Mendez in [facility] Department and he is good to do an in lab anytime.     I have a bed on hold for him on 03/24/19, because that was our soonest available room. He needs to get in as soon as possible.    Farideh Beaulieu

## 2019-03-21 NOTE — TELEPHONE ENCOUNTER
Phone Sleep Study Follow-Up :    Date : 3/21/2019    Zach garcia was contacted today for follow-up of his home sleep study done on 3/20/2019 at the [facility] for possible sleep apnea.    Respiratory events: The home study revealed a presence of 855 obstructive apneas and 123 mixed and 3 central apneas. There were 42 hypopneas resulting in a combined apnea/hypopnea index [AHI] of 130 events per hour.  AHI was 130 per hour supine, - per hour prone, - per hour on left side, and 148.7 per hour on right side.   Pattern: Excluding events noted above, respiratory rate and pattern was Normal.    Sleep Associated Hypoxemia: sustained hypoxemia was present. Baseline oxygen saturation was 91.6%.  Time with saturation less than or equal to 88% was 267.6 minutes. The lowest oxygen saturation was 57%.   Snoring: Snoring was present.     Position: Percent of time spent: supine - 83%, prone - 0%, on left - 0%, on right - 13.4%.     Heart Rate: By pulse oximetry normal rate was noted.      Assessment:   Severe obstructive sleep apnea.  Sleep associated hypoxemia was present.    These findings were reviewed with patient.     Past medical/surgical history, family history, social history, medications and allergies were reviewed.      Problem List:  Patient Active Problem List    Diagnosis Date Noted     BRE (obstructive sleep apnea) 03/21/2019     Priority: Medium     Hypoxia, sleep related 03/21/2019     Priority: Medium     CARDIOVASCULAR SCREENING; LDL GOAL LESS THAN 160 10/19/2018     Priority: Medium     Morbid obesity (H) 10/18/2018     Priority: Medium        Impression/Plan:    1. Severe obstructive sleep apnea  2. Sleep related hypoxemia     - Patient was counseled regarding severe sleep apnea, hypoxia and consequences of untreated disease. PAP therapy is the recommended treatment. Considering severe sleep disordered breathing and hypoxia, he is not a candidate for auto titration PAP therapy.  A titration PSG is recommended for adequate titration of PAP therapy and hypoxemia.     Plan:      1. Titration PSG with TCM    He will follow up with me after sleep study.       Dr. Haseeb Wu     CC: John Richards

## 2019-03-21 NOTE — PROCEDURES
"HOME SLEEP STUDY INTERPRETATION    Patient: Zach Hunter  MRN: [id number]  YOB: 1990  Study Date: 3/20/2019  Referring Provider: John Richards;   Ordering Provider: Haseeb Wu MD, MD     Indications for Home Study: Zach Hunter is a 29 year old male with a history of obesity who presents with symptoms suggestive of obstructive sleep apnea.    Estimated body mass index is 50.4 kg/m  as calculated from the following:    Height as of 19: 1.854 m (6' 1\").    Weight as of 19: 173.3 kg (382 lb).  Total score - [place]: 11 (2019  9:56 AM)  STOP-BAN/8    Data: A full night home sleep study was performed recording the standard physiologic parameters including body position, movement, sound, nasal pressure, thermal oral airflow, chest and abdominal movements with respiratory inductance plethysmography, and oxygen saturation by pulse oximetry. Pulse rate was estimated by oximetry recording. This study was considered adequate based on > 4 hours of quality oximetry and respiratory recording. As specified by the AASM Manual for the Scoring of Sleep and Associated events, version 2.3, Rule VIII.D 1B, 4% oxygen desaturation scoring for hypopneas is used as a standard of care on all home sleep apnea testing.    Analysis Time:  472 minutes    Respiration:   Sleep Associated Hypoxemia: sustained hypoxemia was present. Baseline oxygen saturation was 91.6%.  Time with saturation less than or equal to 88% was 267.6 minutes. The lowest oxygen saturation was 57%.   Snoring: Snoring was present.  Respiratory events: The home study revealed a presence of 855 obstructive apneas and 123 mixed and 3 central apneas. There were 42 hypopneas resulting in a combined apnea/hypopnea index [AHI] of 130 events per hour.  AHI was 130 per hour supine, - per hour prone, - per hour on left side, and 148.7 per hour on right side.   Pattern: Excluding events noted above, respiratory rate and pattern was " Normal.    Position: Percent of time spent: supine - 83%, prone - 0%, on left - 0%, on right - 13.4%.    Heart Rate: By pulse oximetry normal rate was noted.     Assessment:   Severe obstructive sleep apnea.  Sleep associated hypoxemia was present.    Recommendations:  Considering severe sleep disordered breathing and hypoxemia, a titration PSG for optimal PAP titration is recommended.   Weight management.    Diagnosis Code(s): Obstructive Sleep Apnea G47.33, Hypoxemia G47.36    Haseeb Wu MD, MD, March 21, 2019   Diplomate, American Board of Psychiatry and Neurology, Sleep Medicine

## 2019-03-21 NOTE — PROGRESS NOTES
This HSAT was performed using a Noxturnal T3 device which recorded snore, sound, movement activity, body position, nasal pressure, oronasal thermal airflow, pulse, oximetry and both chest and abdominal respiratory effort. HSAT data was restricted to the time patient states they were in bed.     HSAT was scored using 1B 4% hypopnea rule.     HST AHI (Non-PAT): 130  Snoring was reported as very loud.  Time with SpO2 below 89% was 267.6 minutes.   Overall signal quality was good.     Pt will follow up with sleep provider to determine appropriate therapy.

## 2019-03-24 ENCOUNTER — THERAPY VISIT (OUTPATIENT)
Dept: SLEEP MEDICINE | Facility: CLINIC | Age: 29
End: 2019-03-24
Payer: COMMERCIAL

## 2019-03-24 DIAGNOSIS — G47.34 SLEEP-RELATED HYPOXIA: ICD-10-CM

## 2019-03-24 DIAGNOSIS — E66.01 OBESITY, MORBID, BMI 50 OR HIGHER (H): ICD-10-CM

## 2019-03-24 DIAGNOSIS — G47.33 OSA (OBSTRUCTIVE SLEEP APNEA): ICD-10-CM

## 2019-03-24 PROCEDURE — 95811 POLYSOM 6/>YRS CPAP 4/> PARM: CPT | Performed by: PSYCHIATRY & NEUROLOGY

## 2019-03-25 NOTE — PROCEDURES
" SLEEP STUDY INTERPRETATION  TITRATION STUDY      Patient: PHILIP BAIN  YOB: 1990  Study Date: 3/24/2019  MRN: [id number]  Referring Provider: MD Richards Jeffrey David  Ordering Provider: MD Wu Rakesh    Indications for Polysomnography: The patient is a 29 y old Male who is 6' 1\" and weighs 382.0 lbs. His BMI is 50.6, Chamberlain sleepiness scale 11.0 and neck circumference is 53.0 cm. Relevant medical history includes morbid obesity, severe BRE with hypoxemia. A polysomnogram with PAP titration was performed to correct for sleep apnea/hypoventilation/hypoxemia.    Polysomnogram Data: A full night polysomnogram recorded the standard physiologic parameters including EEG, EOG, EMG, ECG, nasal and oral airflow. Respiratory parameters of chest and abdominal movements were recorded with respiratory inductance plethysmography. Oxygen saturation was recorded by pulse oximetry. Hypopnea scoring rule used: 1B 4%.    Treatment PSG  Sleep Architecture:   The total recording time of the polysomnogram was 433.3 minutes. The total sleep time was 420.5 minutes. Sleep latency was 0.8 minutes. REM latency was 3.0 minutes. Arousal index was 14.3 arousals per hour. Sleep efficiency was 97.0%. Wake after sleep onset was 9.5 minutes. The patient spent 3.0% of total sleep time in Stage N1, 29.4% in Stage N2, 22.0% in Stage N3, and 45.7% in REM. Time in REM supine was 139.5 minutes.    Respiration: Sleep disordered breathing noted on the baseline portion of the study was nearly fully resolved with CPAP therapy.  Because of some residual events patient was switched to bilevel which had a modest improvement in work of breathing and gas exchange. Of note the patient did not have REM supine during the study.      The patient was titrated at pressures ranging from CPAP 5 cmH2O up to BiLevel 17/11/0 cmH2O. The final pressure achieved was BiLevel 17/11/0 cmH2O with a residual AHI of 8.7 events per hour. Time in REM supine " on final pressure was 30.5 minutes.     Snoring - was reported as intermittent on treatment.    Respiratory rate and pattern - was notable for normal respiratory rate and pattern.    Sustained Sleep Associated Hypoventilation - Transcutaneous carbon dioxide monitoring was used, and significant hypoventilation was present with a maximum change from 33.4 to 54.2 mmHg but improved on high doses of PAP therapy.     Sleep Associated Hypoxemia - (Greater than 5 minutes O2 sat at or below 88%) was present but improved on high doses of PAP therapy. Baseline oxygen saturation was 94.8%. Lowest oxygen saturation was 77.5%. Time spent less than or equal to 88% was 12.3 minutes. Time spent less than or equal to 89% was 16.0 minutes.    Movement Activity:     Periodic Limb Activity - There were 0 PLMs during the entire study.    REM EMG Activity - Excessive muscle activity was not present.    Nocturnal Behavior - Abnormal sleep related behaviors were not noted.    Bruxism - None apparent.    Cardiac Summary: Sinus, intermittent tachycardia  The average pulse rate was 67.3 bpm. The minimum pulse rate was 42.8 bpm while the maximum pulse rate was 103.2 bpm.     Assessment:     Sleep disordered breathing noted on the baseline portion of the study was nearly fully resolved with CPAP therapy.  Because of some residual events patient was switched to bilevel which had a modest improvement in work of breathing and gas exchange. Of note the patient did not have REM supine during the study.      Recommendations:    Considering the patients morbid obesity, risk for hypoventilation and improvement (although modest) on bilevel, recommend 17/11cm H2O with close clinical follow up.    Advice regarding the risks of drowsy driving.    Suggest optimizing sleep schedule and avoiding sleep deprivation.    Weight management     Diagnostic Code(s): G47.33, G47.36, G47.9      Richardson Loya MD 3-

## 2019-03-25 NOTE — PROGRESS NOTES
Completed a all night titration PSG per provider order.    Preliminary AHI n/a.  A final therapeutic PAP pressure was achieved.    Supine REM was seen on therapeutic pressure.    Patient reports feeling refreshed in AM.

## 2019-03-25 NOTE — PATIENT INSTRUCTIONS
[place] [facility]    1. Your sleep study will be reviewed by a sleep physician within the next few days.     2. Please follow up in the sleep clinic as scheduled, or, make an appointment with your sleep provider to be seen within two weeks to discuss the results of the sleep study.    3. If you have any questions or problems with your treatment plan, please contact your sleep clinic provider at [phone] to further manage your condition.    4. Please review your attached medication list, and, at your follow-up appointment advise your sleep clinic provider about any changes.    5. Go to [email or web address] for more information about your sleep problems.    Lorri Villasenor, SHEMARGT  March 25, 2019

## 2019-03-25 NOTE — PROCEDURES
I called to let the patient know the results of his titration PSG and advise he start bilevel 17/11.     He agreed.     Order is in he will follow up with [facility] and with Dr Wu in 6 weeks.

## 2019-03-26 LAB — SLPCOMP: NORMAL

## 2019-03-28 ENCOUNTER — MYC MEDICAL ADVICE (OUTPATIENT)
Dept: FAMILY MEDICINE | Facility: CLINIC | Age: 29
End: 2019-03-28

## 2019-03-28 DIAGNOSIS — K62.5 RECTAL BLEEDING: Primary | ICD-10-CM

## 2019-04-01 NOTE — TELEPHONE ENCOUNTER
FUTURE VISIT INFORMATION      FUTURE VISIT INFORMATION:    Date: 4/16/19    Time: 10:30am    Location: [facility]  REFERRAL INFORMATION:    Referring provider:  Dr. Todd Swann    Referring providers clinic:  [facility]    Reason for visit/diagnosis:  Rectal Bleeding    NOTES STATUS DETAILS   OFFICE NOTE from referring provider  Internal 3/28/19 [facility] Medical Advice   OFFICE NOTE from other specialist   Internal 10/18/18   DISCHARGE SUMMARY FROM HOSPITAL N/A    DISCHARGE REPORT FROM ED N/A    OPERATIVE REPORT  N/A    PFC REPORT N/A    MEDICATION LIST N/A    LABS     FIT/STOOL TESTING N/A    ANAL PAP N/A    PATHOLOGY REPORTS RELATED TO DIAGNOSIS N/A    DIAGNOSTIC PROCEDURES     COLONOSCOPY N/A    ENDOSCOPY (EGD) N/A    ERCP N/A    ANOSCOPY N/A    FLEX SIGMOIDOSCOPY N/A    IMAGING & REPORT      CT, MRI, US, XR N/A

## 2019-04-05 ENCOUNTER — OFFICE VISIT (OUTPATIENT)
Dept: PODIATRY | Facility: CLINIC | Age: 29
End: 2019-04-05
Payer: COMMERCIAL

## 2019-04-05 ENCOUNTER — ANCILLARY PROCEDURE (OUTPATIENT)
Dept: GENERAL RADIOLOGY | Facility: CLINIC | Age: 29
End: 2019-04-05
Attending: PODIATRIST
Payer: COMMERCIAL

## 2019-04-05 VITALS
HEART RATE: 100 BPM | BODY MASS INDEX: 41.75 KG/M2 | DIASTOLIC BLOOD PRESSURE: 77 MMHG | HEIGHT: 73 IN | SYSTOLIC BLOOD PRESSURE: 122 MMHG | WEIGHT: 315 LBS

## 2019-04-05 DIAGNOSIS — M79.671 RIGHT FOOT PAIN: Primary | ICD-10-CM

## 2019-04-05 DIAGNOSIS — M79.671 RIGHT FOOT PAIN: ICD-10-CM

## 2019-04-05 DIAGNOSIS — Q66.70 CAVUS DEFORMITY OF FOOT: ICD-10-CM

## 2019-04-05 PROCEDURE — 73630 X-RAY EXAM OF FOOT: CPT | Mod: RT

## 2019-04-05 PROCEDURE — 99203 OFFICE O/P NEW LOW 30 MIN: CPT | Performed by: PODIATRIST

## 2019-04-05 ASSESSMENT — MIFFLIN-ST. JEOR: SCORE: 2760.69

## 2019-04-05 NOTE — LETTER
2019         RE: Zach Hunter  [address]  [place] [address]        Dear Colleague,    Thank you for referring your patient, Zach Hunter, to the [facility]. Please see a copy of my visit note below.    PATIENT HISTORY:  Zach Hunter is a 29 year old male who presents to clinic for R lateral foot pain, worse at end of day with activity.  No injury.  Present for a few years.  Dr. dAen's inserts helped for a bit.  -6/10 pain.      Review of Systems:  Patient denies fever, chills, rash, wound, stiffness, limping, numbness, weakness.  Reports anxiety, occasional blood in stool but not currently (advised PCP f/u for this).     PAST MEDICAL HISTORY: No pertinent past medical history.     PAST SURGICAL HISTORY: No pertinent past surgical history.     MEDICATIONS: No current outpatient medications on file.     ALLERGIES:  No Known Allergies     SOCIAL HISTORY:   Social History     Socioeconomic History     Marital status: Single     Spouse name: Not on file     Number of children: Not on file     Years of education: Not on file     Highest education level: Not on file   Occupational History     Not on file   Social Needs     Financial resource strain: Not on file     Food insecurity:     Worry: Not on file     Inability: Not on file     Transportation needs:     Medical: Not on file     Non-medical: Not on file   Tobacco Use     Smoking status: Former Smoker     Packs/day: 0.50     Years: 10.00     Pack years: 5.00     Types: Cigarettes, Cigars, Hookah     Start date: 2007     Last attempt to quit: 2018     Years since quittin.8     Smokeless tobacco: Never Used   Substance and Sexual Activity     Alcohol use: Yes     Comment: Casual, 1-2 per week with dinner     Drug use: Yes     Types: Marijuana     Comment: Recreational use     Sexual activity: Yes     Partners: Female     Birth control/protection: IUD   Lifestyle     Physical activity:     Days per week: Not  "on file     Minutes per session: Not on file     Stress: Not on file   Relationships     Social connections:     Talks on phone: Not on file     Gets together: Not on file     Attends Mandaeism service: Not on file     Active member of club or organization: Not on file     Attends meetings of clubs or organizations: Not on file     Relationship status: Not on file     Intimate partner violence:     Fear of current or ex partner: Not on file     Emotionally abused: Not on file     Physically abused: Not on file     Forced sexual activity: Not on file   Other Topics Concern     Parent/sibling w/ CABG, MI or angioplasty before 65F 55M? No   Social History Narrative     Not on file        FAMILY HISTORY:   Family History   Problem Relation Age of Onset     Colon Cancer Father         Haven't been in contact since ~2003; unsure of current status        EXAM:Vitals: /77   Pulse 100   Ht 1.854 m (6' 1\")   Wt (!) 174.2 kg (384 lb)   BMI 50.66 kg/m     BMI= Body mass index is 50.66 kg/m .    General appearance: Patient is alert and fully cooperative with history & exam.  No sign of distress is noted during the visit.     Psychiatric: Affect is pleasant & appropriate.  Patient appears motivated to improve health.     Respiratory: Breathing is regular & unlabored while sitting.     HEENT: Hearing is intact to spoken word.  Speech is clear.  No gross evidence of visual impairment that would impact ambulation.     Dermatologic: Skin is intact to R foot without significant lesions, rash or abrasion.  No paronychia or evidence of soft tissue infection is noted.     Vascular: DP & PT pulses are intact & regular on the R.  No significant edema or varicosities noted.  CFT and skin temperature are normal.     Neurologic: Lower extremity sensation is intact to light touch.  No evidence of weakness or contracture in the lower extremities.  No evidence of neuropathy.     Musculoskeletal: Diffuse R foot lateral column pain.  No " significant focal pain at 5th met base.  Cavus foot type b/l.  Patient is ambulatory without assistive device or brace.  No gross ankle deformity noted.  No foot or ankle joint effusion is noted.    XRs of R foot reviewed with pt.  Chronic appearing ossicle at base of 5th metatarsal.  Accessory bone vs old injury.     ASSESSMENT: R foot pain, cavus foot     PLAN:  Reviewed patient's chart in epic.  Discussed condition and treatment options including pros and cons.    Discussed possible chronic fx at base of 5th metatarsal.  Pt does not recall injury so this is likely a chronic ossicle/accessory bone.  Sometimes surgical removal is considered if chronic pain seems to be from this area.  Not convinced this is the primary source.  Discussed local tendon attachment may be a factor.      Weight loss advised.  Advised more supportive shoes, orthotics.  OTC orthotics discussed.  Custom ordered.  Pt will proceed with orthotics.  F/u prn.       Kevyn Aiken DPM, FACFAS    Weight management plan: Patient was referred to their PCP to discuss a diet and exercise plan.      Again, thank you for allowing me to participate in the care of your patient.        Sincerely,        Kevyn Aiken DPM

## 2019-04-05 NOTE — PROGRESS NOTES
PATIENT HISTORY:  Zach Hunter is a 29 year old male who presents to clinic for R lateral foot pain, worse at end of day with activity.  No injury.  Present for a few years.  Dr. Aden's inserts helped for a bit.  1-6/10 pain.      Review of Systems:  Patient denies fever, chills, rash, wound, stiffness, limping, numbness, weakness.  Reports anxiety, occasional blood in stool but not currently (advised PCP f/u for this).     PAST MEDICAL HISTORY: No pertinent past medical history.     PAST SURGICAL HISTORY: No pertinent past surgical history.     MEDICATIONS: No current outpatient medications on file.     ALLERGIES:  No Known Allergies     SOCIAL HISTORY:   Social History     Socioeconomic History     Marital status: Single     Spouse name: Not on file     Number of children: Not on file     Years of education: Not on file     Highest education level: Not on file   Occupational History     Not on file   Social Needs     Financial resource strain: Not on file     Food insecurity:     Worry: Not on file     Inability: Not on file     Transportation needs:     Medical: Not on file     Non-medical: Not on file   Tobacco Use     Smoking status: Former Smoker     Packs/day: 0.50     Years: 10.00     Pack years: 5.00     Types: Cigarettes, Cigars, Hookah     Start date: 2007     Last attempt to quit: 2018     Years since quittin.8     Smokeless tobacco: Never Used   Substance and Sexual Activity     Alcohol use: Yes     Comment: Casual, 1-2 per week with dinner     Drug use: Yes     Types: Marijuana     Comment: Recreational use     Sexual activity: Yes     Partners: Female     Birth control/protection: IUD   Lifestyle     Physical activity:     Days per week: Not on file     Minutes per session: Not on file     Stress: Not on file   Relationships     Social connections:     Talks on phone: Not on file     Gets together: Not on file     Attends Caodaism service: Not on file     Active member of club or  "organization: Not on file     Attends meetings of clubs or organizations: Not on file     Relationship status: Not on file     Intimate partner violence:     Fear of current or ex partner: Not on file     Emotionally abused: Not on file     Physically abused: Not on file     Forced sexual activity: Not on file   Other Topics Concern     Parent/sibling w/ CABG, MI or angioplasty before 65F 55M? No   Social History Narrative     Not on file        FAMILY HISTORY:   Family History   Problem Relation Age of Onset     Colon Cancer Father         Haven't been in contact since ~2003; unsure of current status        EXAM:Vitals: /77   Pulse 100   Ht 1.854 m (6' 1\")   Wt (!) 174.2 kg (384 lb)   BMI 50.66 kg/m    BMI= Body mass index is 50.66 kg/m .    General appearance: Patient is alert and fully cooperative with history & exam.  No sign of distress is noted during the visit.     Psychiatric: Affect is pleasant & appropriate.  Patient appears motivated to improve health.     Respiratory: Breathing is regular & unlabored while sitting.     HEENT: Hearing is intact to spoken word.  Speech is clear.  No gross evidence of visual impairment that would impact ambulation.     Dermatologic: Skin is intact to R foot without significant lesions, rash or abrasion.  No paronychia or evidence of soft tissue infection is noted.     Vascular: DP & PT pulses are intact & regular on the R.  No significant edema or varicosities noted.  CFT and skin temperature are normal.     Neurologic: Lower extremity sensation is intact to light touch.  No evidence of weakness or contracture in the lower extremities.  No evidence of neuropathy.     Musculoskeletal: Diffuse R foot lateral column pain.  No significant focal pain at 5th met base.  Cavus foot type b/l.  Patient is ambulatory without assistive device or brace.  No gross ankle deformity noted.  No foot or ankle joint effusion is noted.    XRs of R foot reviewed with pt.  Chronic " appearing ossicle at base of 5th metatarsal.  Accessory bone vs old injury.     ASSESSMENT: R foot pain, cavus foot     PLAN:  Reviewed patient's chart in epic.  Discussed condition and treatment options including pros and cons.    Discussed possible chronic fx at base of 5th metatarsal.  Pt does not recall injury so this is likely a chronic ossicle/accessory bone.  Sometimes surgical removal is considered if chronic pain seems to be from this area.  Not convinced this is the primary source.  Discussed local tendon attachment may be a factor.      Weight loss advised.  Advised more supportive shoes, orthotics.  OTC orthotics discussed.  Custom ordered.  Pt will proceed with orthotics.  F/u prn.       Kevyn Aiken DPM, FACFAS    Weight management plan: Patient was referred to their PCP to discuss a diet and exercise plan.

## 2019-04-05 NOTE — PATIENT INSTRUCTIONS
Thank you for choosing [facility]!    Follow up as needed    DR. HESS'S CLINIC LOCATIONS     MONDAY  [place] TUESDAY & FRIDAY AM  [place]   [address]   [address]   [place] [address] [place] [address]   [phone]  [phone]  [phone]       WEDNESDAY  [place] SCHEDULE SURGERY: [phone]   [address] [place] [address] APPOINTMENTS: [phone]   [place] [address] BILLING QUESTIONS: [phone]   [phone]         CAVUS FOOT (HIGH-ARCHED FOOT)  Cavus foot is a condition in which the foot has a very high arch. Because of this high arch, an excessive amount of weight is placed on the ball and heel of the foot when walking or standing. Cavus foot can lead to a variety of signs and symptoms, such as pain and instability. It can develop at any age and can occur in one or both feet.  CAUSES  Cavus foot is often caused by a neurologic disorder or other medical condition, such as cerebral palsy, Charcot-Yesica-Tooth disease, spina bifida, polio, muscular dystrophy or stroke. In other cases of cavus foot, the high arch may represent an inherited structural abnormality. An accurate diagnosis is important because the underlying cause of cavus foot largely determines its future course. If the high arch is due to a neurologic disorder or other medical condition, it is likely to progressively worsen. On the other hand, cases of cavus foot that do not result from neurologic disorders usually do not change in appearance.  SYMPTOMS  The arch of a cavus foot will appear high even when standing. In addition, one or more of the following symptoms may be present:  Hammertoes (bent toes) or claw toes (toes clenched like a fist)   Calluses on the ball, side or heel of the foot   Pain when standing or walking   An unstable foot due to the heel tilting inward, which can lead to ankle sprains  Some people with cavus foot may  also experience foot drop, a weakness of the muscles in the foot and ankle that results in dragging the foot when taking a step. Foot drop is usually a sign of an underlying neurologic condition.   DIAGNOSIS   Diagnosis of cavus foot includes a review of the patient s family history. The foot and ankle surgeon examines the foot, looking for a high arch and possible calluses, hammertoes and claw toes. The foot is tested for muscle strength, and the patient s walking pattern and coordination are observed. If a neurologic condition appears to be present, the entire limb may be examined. The surgeon may also study the pattern of wear on the patient's shoes.  X-rays are sometimes ordered to further assess the condition. In addition, the surgeon may refer the patient to a neurologist for a complete neurologic evaluation.  NON-SURGICAL TREATMENT  Nonsurgical treatment of cavus foot may include one or more of the following options:  Orthotic devices. Custom orthotic devices that fit into the shoe can be beneficial because they provide stability and cushioning to the foot.   Shoe modifications. High-topped shoes support the ankle, and shoes with heels a little wider on the bottom add stability.   Bracing. The surgeon may recommend a brace to help keep the foot and ankle stable. Bracing is also useful in managing foot drop.  SURGERY  If nonsurgical treatment fails to adequately relieve pain and improve stability, surgery may be needed to decrease pain, increase stability and compensate for weakness in the foot. The surgeon will choose the best surgical procedure or combination of procedures based on the patient s individual case. In some cases where an underlying neurologic problem exists, surgery may be needed again in the future due to the progression of the disorder.    OVER THE COUNTER INSERTS    SuperFeet   Sofsole Fit MediaXstream   Power Step   Walk-Fit Arch Cradles     Most of these can be found at your local [facility],  [facility], or online:    1.  https://[email or web address]/en-us/  2.  Https://TMAT/  3.  Https://www.powersteps.com/    **A good high quality over the counter insert should cost around $40-$50      PRICE THERAPY  Many aches and pains throughout the foot and ankle can be helped with many simple treatments. This is usually described as PRICE Therapy.      P - Protection - often times, inflammation/pain in the lower extremity is not able to improve simply because the areas involved are never allowed to rest. Every step we take can bother the problematic area. Protecting those areas is an important step in the healing process. This may involve a walking cast boot, a special insert/orthotic device, an ankle brace, or simply avoiding barefoot walking.    R - Rest - in addition to protecting the foot/ankle, resting is an important, but often times difficult, treatment option. Getting off your feet when they bother you, and specifically avoiding activities that cause pain/discomfort, are very beneficial to prevent, and treat, foot/ankle pain.      I - Ice - icing regularly can help to decrease inflammation and swelling in the foot, thus decreasing pain. Using an ice pack or a bag of frozen veggies works very well. Ice for 20 minutes multiple times per day as needed.  Do not place the ice directly on the skin as this can cause tissue damage.    C - Compression - using a compression wrap or an ACE wrap can help to decrease swelling, which can help to decrease pain. Wearing the wraps is generally not needed at night, but they should be worn on a regular basis when you are going to be on your feet for prolonged periods as gravity tends to pull fluids down to your feet/ankles.    E - Elevation - elevating your lower extremities multiple times daily for 15-20 minutes can help to decrease swelling, which works well in decreasing pain levels.    NSAID/Tylenol - Anti-inflammatories like  Aleve or ibuprofen, and/or a pain medication, such as Tylenol, can help to improve pain levels and get the issue resolved sooner rather than later. Anyone with liver issues should be careful with Tylenol, and anyone with high blood pressure or heart, stomach or kidney issues should be careful with anti-inflammatories. Please ask if you have questions about these medications, including dosage.    [facility] LOCATIONS  [facility]  [address]  [place] [address]  Phone: [phone]  Fax: [phone] [facility]  [address]  [place] [address]  Phone: [phone]   Fax: [phone]   [facility]  [address]  [place] [address]  Phone: [phone]  Fax: [phone] [facility]  [address]  [place] [address]  Phone: [phone]   Fax: [phone]   [facility]   [address]  [place] [address]  Phone: [phone]   Fax: [phone] * Please call any location listed to make an appointment for a casting/fitting. Your referral was sent to their central office and they will all have the order on file.           BODY WEIGHT AND YOUR FEET  The following information is included in the after visit summary for all patients. Body weight can be a sensitive issue to discuss in clinic, but we think the following information is very important. Although we focus on the feet and ankles, we do support the overall health of our patients.     Many things can cause foot and ankle problems. Foot structure, activity level, foot mechanics and injuries are common causes of pain. One very important issue that often goes unmentioned, is body weight. Extra weight can cause increased stress on muscles, ligaments, bones and tendons. Sometimes just a few extra pounds is all it takes to put one over her/his threshold. Without reducing that stress, it can be  difficult to alleviate pain. As Foot & Ankle specialists, our job is addressing the lower extremity problem and possible causes. Regarding extra body weight, we encourage patients to discuss diet and weight management plans with their primary care doctors. It is this team approach that gives you the best opportunity for pain relief and getting you back on your feet.      [facility] has a Comprehensive Weight Management Program. This program includes counseling, education, non-surgical and surgical approaches to weight loss. If you are interested in learning more either talk to you primary care provider or call [phone].

## 2019-04-08 ENCOUNTER — DOCUMENTATION ONLY (OUTPATIENT)
Dept: SLEEP MEDICINE | Facility: CLINIC | Age: 29
End: 2019-04-08

## 2019-04-08 DIAGNOSIS — G47.33 OSA (OBSTRUCTIVE SLEEP APNEA): Primary | ICD-10-CM

## 2019-04-08 NOTE — PROGRESS NOTES
Patient was offered choice of vendor and chose [facility].  Patient Zach Hunter was set up at [place] on April 8, 2019. Patient received a Resmed AirCurve 10 Bilevel. Pressures were set at 11-17 CM H20.   Patient s ramp is 4 cm H2O for 35 min and FLEX/EPR is 2.  Patient received a Resmed Mask name: AIRFIT F30  Full Face mask size Medium, heated tubing and heated humidifier.  Patient is enrolled in the STM Program and does not need to meet compliance. Patient has a follow up on 5/30/2019 with Dr. Wu.    Julio C Gonzalez

## 2019-04-11 ENCOUNTER — DOCUMENTATION ONLY (OUTPATIENT)
Dept: SLEEP MEDICINE | Facility: CLINIC | Age: 29
End: 2019-04-11
Payer: COMMERCIAL

## 2019-04-11 NOTE — PROGRESS NOTES
3 DAY STM VISIT    Diagnostic AHI:  130 HST    Patient contacted for 3 day STM visit.  Subjective measures:  Patient struggling with pressure and wants to use the mask he had at his sleep study.      Device type: Bilevel  PAP settings from order::  17/11 cm H20  Mask type:    Full Face Mask     Device settings from machine        Assessment: Nightly usage, most nights under four hours. Told patient to practice using mask during the day and over the weekend. Patient will call me Monday if problems continue.   Action plan: Pt to have f/u 14 day STM visit.  Patient has a follow up visit scheduled:   yes within 31-90 days of set up.

## 2019-04-16 ENCOUNTER — OFFICE VISIT (OUTPATIENT)
Dept: SURGERY | Facility: CLINIC | Age: 29
End: 2019-04-16
Attending: FAMILY MEDICINE
Payer: COMMERCIAL

## 2019-04-16 ENCOUNTER — TELEPHONE (OUTPATIENT)
Dept: GASTROENTEROLOGY | Facility: CLINIC | Age: 29
End: 2019-04-16

## 2019-04-16 ENCOUNTER — PRE VISIT (OUTPATIENT)
Dept: SURGERY | Facility: CLINIC | Age: 29
End: 2019-04-16

## 2019-04-16 VITALS
HEIGHT: 73 IN | OXYGEN SATURATION: 95 % | DIASTOLIC BLOOD PRESSURE: 69 MMHG | SYSTOLIC BLOOD PRESSURE: 126 MMHG | HEART RATE: 55 BPM | BODY MASS INDEX: 41.75 KG/M2 | WEIGHT: 315 LBS

## 2019-04-16 DIAGNOSIS — K62.5 RECTAL BLEEDING: Primary | ICD-10-CM

## 2019-04-16 ASSESSMENT — ENCOUNTER SYMPTOMS
HEMOPTYSIS: 0
ARTHRALGIAS: 0
CONSTIPATION: 1
VOMITING: 0
RECTAL PAIN: 0
MYALGIAS: 1
BOWEL INCONTINENCE: 1
DIARRHEA: 1
POSTURAL DYSPNEA: 0
HEARTBURN: 0
COUGH DISTURBING SLEEP: 0
DYSPNEA ON EXERTION: 0
ABDOMINAL PAIN: 0
SPUTUM PRODUCTION: 0
JOINT SWELLING: 0
WHEEZING: 0
STIFFNESS: 1
NAUSEA: 0
MUSCLE CRAMPS: 0
BLOATING: 0
BLOOD IN STOOL: 1
BACK PAIN: 1
NECK PAIN: 1
JAUNDICE: 0
SNORES LOUDLY: 1
SHORTNESS OF BREATH: 0
MUSCLE WEAKNESS: 0
COUGH: 0

## 2019-04-16 ASSESSMENT — MIFFLIN-ST. JEOR: SCORE: 2758.42

## 2019-04-16 ASSESSMENT — PAIN SCALES - GENERAL: PAINLEVEL: NO PAIN (0)

## 2019-04-16 NOTE — PROGRESS NOTES
"Colon and Rectal Surgery Consult Clinic Note    Date: 2019     Referring provider:  Todd Swann MD  [address] [place] [address]     RE: Zach Hunter  : 1990  ASHTYN: 2019     Zach Hunter is a very pleasant 29 year old male without a significant past medical history with a recent diagnosis of rectal bleeding.  Given these findings they were subsequently sent to the Colon and Rectal Surgery Clinic for an opinion on this and a new patient consultation.     Zach has had intermittent rectal bleeding for the past few years.  This is often with wiping but has dripped into the toilet bowl on multiple occasions.  It occurs about 1-2 times a year.  He was seen by a gastroenterologist in [place] with a colonoscopy scheduled but never followed through with this.  He does have some urgency with bowel movements and has 3-4 fairly runny bowel movements a day along with intermittent constipation.  She occasionally has to take MiraLAX when he becomes constipated.  He denies any pain with bowel movements.  He does have some bloating and increased flatulence.  He rarely has abdominal pain.  No nausea or vomiting.  No unexplained weight loss.  No prolapsing hemorrhoidal tissue.  He does report that his father had colon cancer.  He states that this was either in his late 40s early 50s.  No family history of any inflammatory bowel disease.  He is no longer in contact with his father and knows very little about his family history on his father's side.    Physical Examination: Exam was chaperoned by CLAIRE Vines   /69 (BP Location: Left arm, Patient Position: Sitting, Cuff Size: Adult Regular)   Pulse 55   Ht 6' 1\"   Wt (!) 383 lb 8 oz   SpO2 95%   BMI 50.60 kg/m    General: alert, oriented, in no acute distress, sitting comfortably  HEENT: mucous membranes moist  Perianal external examination:  Perianal skin: some mild excoriation.  Lesions: No evidence of an " external lesion, nodularity, or induration in the perianal region.  Eversion of buttocks: There was not evidence of an anal fissure. Details: N/A.  Skin tags or external hemorrhoids: None.  Digital rectal examination: Was performed but was limited due to body habitus  Sphincter tone: Good.  Palpable lesions: No.  Prostate: Not assessed.  Other: None.    Anoscopy: Was performed.   Hemorrhoids: Yes. Grade 1-2 internal hemorrhoids without active bleeding  Lesions: No    Assessment/Plan: 29 year old male with intermittent rectal bleeding.  On exam he does have some small internal hemorrhoids.  The certainly could be the source of his bleeding but given the long-standing nature of the bleeding as well as a family history of colon cancer, would recommend a colonoscopy at this time to rule out any malignant sources of bleeding.  In the meantime, recommend that he start on a daily fiber supplement especially given his looser stools. Patient's questions were answered to his stated satisfaction and he is in agreement with this plan.    Medical history:  No past medical history on file.    Surgical history:  No past surgical history on file.    Problem list:    Patient Active Problem List    Diagnosis Date Noted     BRE (obstructive sleep apnea) 03/21/2019     Priority: Medium     Hypoxia, sleep related 03/21/2019     Priority: Medium     CARDIOVASCULAR SCREENING; LDL GOAL LESS THAN 160 10/19/2018     Priority: Medium     Morbid obesity (H) 10/18/2018     Priority: Medium       Medications:  No current outpatient medications on file.       Allergies:  No Known Allergies    Family history:  Family History   Problem Relation Age of Onset     Colon Cancer Father         Haven't been in contact since ~2003; unsure of current status       Social history:  Social History     Tobacco Use     Smoking status: Former Smoker     Packs/day: 0.50     Years: 10.00     Pack years: 5.00     Types: Cigarettes, Cigars, Hookah     Start date:  2007     Last attempt to quit: 2018     Years since quittin.8     Smokeless tobacco: Never Used   Substance Use Topics     Alcohol use: Yes     Comment: Casual, 1-2 per week with dinner    Marital status: single.  Occupation: fundraising.    There are no exam notes on file for this visit.       Total face to face time was 30 minutes, >50% counseling.    ALFREDO Chavira, NP-C  Colon and Rectal Surgery   [facility]    This note was created using speech recognition software and may contain unintended word substitutions.

## 2019-04-16 NOTE — LETTER
2019       RE: Zach Hunter  [address]  [facility] [place] [facility] [address]     Dear Colleague,    Thank you for referring your patient, Zach Hunter, to the [facility] COLON AND RECTAL SURGERY at [facility]. Please see a copy of my visit note below.    Colon and Rectal Surgery Consult Clinic Note    Date: 2019     Referring provider:  Todd Swann MD  [address] [place] [address]     RE: Zach Hunter  : 1990  ASHTYN: 2019     Zach Hunter is a very pleasant 29 year old male without a significant past medical history with a recent diagnosis of rectal bleeding.  Given these findings they were subsequently sent to the Colon and Rectal Surgery Clinic for an opinion on this and a new patient consultation.     Zach has had intermittent rectal bleeding for the past few years.  This is often with wiping but has dripped into the toilet bowl on multiple occasions.  It occurs about 1-2 times a year.  He was seen by a gastroenterologist in [place] with a colonoscopy scheduled but never followed through with this.  He does have some urgency with bowel movements and has 3-4 fairly runny bowel movements a day along with intermittent constipation.  She occasionally has to take MiraLAX when he becomes constipated.  He denies any pain with bowel movements.  He does have some bloating and increased flatulence.  He rarely has abdominal pain.  No nausea or vomiting.  No unexplained weight loss.  No prolapsing hemorrhoidal tissue.  He does report that his father had colon cancer.  He states that this was either in his late 40s early 50s.  No family history of any inflammatory bowel disease.  He is no longer in contact with his father and knows very little about his family history on his father's side.    Physical Examination: Exam was chaperoned by LCAIRE Vines   /69 (BP Location: Left arm, Patient Position: Sitting, Cuff  "Size: Adult Regular)   Pulse 55   Ht 6' 1\"   Wt (!) 383 lb 8 oz   SpO2 95%   BMI 50.60 kg/m     General: alert, oriented, in no acute distress, sitting comfortably  HEENT: mucous membranes moist  Perianal external examination:  Perianal skin: some mild excoriation.  Lesions: No evidence of an external lesion, nodularity, or induration in the perianal region.  Eversion of buttocks: There was not evidence of an anal fissure. Details: N/A.  Skin tags or external hemorrhoids: None.  Digital rectal examination: Was performed but was limited due to body habitus  Sphincter tone: Good.  Palpable lesions: No.  Prostate: Not assessed.  Other: None.    Anoscopy: Was performed.   Hemorrhoids: Yes. Grade 1-2 internal hemorrhoids without active bleeding  Lesions: No    Assessment/Plan: 29 year old male with intermittent rectal bleeding.  On exam he does have some small internal hemorrhoids.  The certainly could be the source of his bleeding but given the long-standing nature of the bleeding as well as a family history of colon cancer, would recommend a colonoscopy at this time to rule out any malignant sources of bleeding.  In the meantime, recommend that he start on a daily fiber supplement especially given his looser stools. Patient's questions were answered to his stated satisfaction and he is in agreement with this plan.    Medical history:  No past medical history on file.    Surgical history:  No past surgical history on file.    Problem list:    Patient Active Problem List    Diagnosis Date Noted     BRE (obstructive sleep apnea) 03/21/2019     Priority: Medium     Hypoxia, sleep related 03/21/2019     Priority: Medium     CARDIOVASCULAR SCREENING; LDL GOAL LESS THAN 160 10/19/2018     Priority: Medium     Morbid obesity (H) 10/18/2018     Priority: Medium       Medications:  No current outpatient medications on file.       Allergies:  No Known Allergies    Family history:  Family History   Problem Relation Age of " Onset     Colon Cancer Father         Haven't been in contact since ~; unsure of current status       Social history:  Social History     Tobacco Use     Smoking status: Former Smoker     Packs/day: 0.50     Years: 10.00     Pack years: 5.00     Types: Cigarettes, Cigars, Hookah     Start date: 2007     Last attempt to quit: 2018     Years since quittin.8     Smokeless tobacco: Never Used   Substance Use Topics     Alcohol use: Yes     Comment: Casual, 1-2 per week with dinner    Marital status: single.  Occupation: fundraising.    There are no exam notes on file for this visit.     Total face to face time was 30 minutes, >50% counseling.    ALFREDO Chavira, NP-C  Colon and Rectal Surgery   [facility]    This note was created using speech recognition software and may contain unintended word substitutions.

## 2019-04-17 ENCOUNTER — TELEPHONE (OUTPATIENT)
Dept: GASTROENTEROLOGY | Facility: CLINIC | Age: 29
End: 2019-04-17

## 2019-04-18 ENCOUNTER — TELEPHONE (OUTPATIENT)
Dept: GASTROENTEROLOGY | Facility: CLINIC | Age: 29
End: 2019-04-18

## 2019-04-18 DIAGNOSIS — Z12.11 SPECIAL SCREENING FOR MALIGNANT NEOPLASMS, COLON: Primary | ICD-10-CM

## 2019-04-18 NOTE — TELEPHONE ENCOUNTER
Order Questions     Question Answer Comment   Procedure Colonoscopy    Purpose of Procedure Diagnostic    Does the patient have the following? Obstructive Sleep Apnea BMI 50.60   Is the patient on the following medications? None    Referring provider Diana Dunn PCP John Richards       Associated Diagnoses     Rectal bleeding [K62.5]  - Primary         ? no    Arrival time verified? 1340    Facility location verified? [address]    Instructions given regarding prep and procedure    Prep Type golytely e script sent to [facility] in [facility]    Are you taking any anticoagulants or blood thinners? no    Instructions given? Yes, sent via [employer]    Electronic implanted devices? no    Pre procedure teaching completed? Yes    Transportation from procedure? Yes, instructed patient on expectations and patient verbalized understanding of escort and  to stay with him at least 6 hours after exam    H&P / Pre op physical completed? n/a

## 2019-04-22 NOTE — TELEPHONE ENCOUNTER
Notified patient of availability to have his scheduled colonoscopy with a 7 am check in for a 8 am procedure start time or a 1300 check in time. Writer will also Stand [employer] patient message. Left a VM with a call back number if interested in coming in earlier

## 2019-04-23 ENCOUNTER — DOCUMENTATION ONLY (OUTPATIENT)
Dept: SLEEP MEDICINE | Facility: CLINIC | Age: 29
End: 2019-04-23

## 2019-04-23 DIAGNOSIS — G47.34 HYPOXIA, SLEEP RELATED: ICD-10-CM

## 2019-04-23 DIAGNOSIS — G47.33 OSA (OBSTRUCTIVE SLEEP APNEA): ICD-10-CM

## 2019-04-23 NOTE — PROGRESS NOTES
14  DAY STM VISIT    Diagnostic AHI:   130 HST    Subjective measures:   Pt feels things have been a love/hate relationship.  He feels that he sleeps better with it, however he finds that the mask comes off during sleep.  He does not always find the mask to be comfortable.  He is thinking about doing a mask exchange.      Assessment: Pt not meeting objective benchmarks for leak and compliance  Patient meeting subjective benchmarks.     Action plan: pt to have 30 day STM visit.      Device type: Bilevel    PAP settings:    Bilevel S ()    EPAP Fixed 11    IPAP Fixed 17        Mask type:  Full Face Mask    Objective measures: 14 day rolling measures      Compliance  15 %      Leak  29.67 lpm  last  upload      AHI 5.29   last  upload      Average number of minutes 148      Objective measure goal  Compliance   Goal >70%  Leak   Goal < 24 lpm  AHI  Goal < 5  Usage  Goal >240

## 2019-04-24 ENCOUNTER — HOSPITAL ENCOUNTER (OUTPATIENT)
Facility: CLINIC | Age: 29
Discharge: HOME | End: 2019-04-24
Attending: INTERNAL MEDICINE | Admitting: INTERNAL MEDICINE
Payer: COMMERCIAL

## 2019-04-24 VITALS
RESPIRATION RATE: 17 BRPM | SYSTOLIC BLOOD PRESSURE: 124 MMHG | OXYGEN SATURATION: 96 % | HEART RATE: 74 BPM | DIASTOLIC BLOOD PRESSURE: 76 MMHG

## 2019-04-24 PROCEDURE — G0500 MOD SEDAT ENDO SERVICE >5YRS: HCPCS | Performed by: INTERNAL MEDICINE

## 2019-04-24 PROCEDURE — 45380 COLONOSCOPY AND BIOPSY: CPT | Performed by: INTERNAL MEDICINE

## 2019-04-24 PROCEDURE — 88305 TISSUE EXAM BY PATHOLOGIST: CPT | Performed by: INTERNAL MEDICINE

## 2019-04-24 PROCEDURE — 25000128 H RX IP 250 OP 636: Performed by: INTERNAL MEDICINE

## 2019-04-24 PROCEDURE — 99153 MOD SED SAME PHYS/QHP EA: CPT | Performed by: INTERNAL MEDICINE

## 2019-04-24 PROCEDURE — 40000556 ZZH STATISTIC PERIPHERAL IV START W US GUIDANCE

## 2019-04-24 PROCEDURE — 45385 COLONOSCOPY W/LESION REMOVAL: CPT | Performed by: INTERNAL MEDICINE

## 2019-04-24 RX ORDER — FENTANYL CITRATE 50 UG/ML
INJECTION, SOLUTION INTRAMUSCULAR; INTRAVENOUS PRN
Status: DISCONTINUED | OUTPATIENT
Start: 2019-04-24 | End: 2019-04-24 | Stop reason: HOSPADM

## 2019-04-24 RX ORDER — FLUMAZENIL 0.1 MG/ML
0.2 INJECTION, SOLUTION INTRAVENOUS
Status: DISCONTINUED | OUTPATIENT
Start: 2019-04-24 | End: 2019-04-24 | Stop reason: HOSPADM

## 2019-04-24 RX ORDER — NALOXONE HYDROCHLORIDE 0.4 MG/ML
.1-.4 INJECTION, SOLUTION INTRAMUSCULAR; INTRAVENOUS; SUBCUTANEOUS
Status: DISCONTINUED | OUTPATIENT
Start: 2019-04-24 | End: 2019-04-24 | Stop reason: HOSPADM

## 2019-04-24 RX ORDER — ONDANSETRON 2 MG/ML
4 INJECTION INTRAMUSCULAR; INTRAVENOUS EVERY 6 HOURS PRN
Status: DISCONTINUED | OUTPATIENT
Start: 2019-04-24 | End: 2019-04-24 | Stop reason: HOSPADM

## 2019-04-24 RX ORDER — ONDANSETRON 4 MG/1
4 TABLET, ORALLY DISINTEGRATING ORAL EVERY 6 HOURS PRN
Status: DISCONTINUED | OUTPATIENT
Start: 2019-04-24 | End: 2019-04-24 | Stop reason: HOSPADM

## 2019-04-24 RX ORDER — LIDOCAINE 40 MG/G
CREAM TOPICAL
Status: DISCONTINUED | OUTPATIENT
Start: 2019-04-24 | End: 2019-04-24 | Stop reason: HOSPADM

## 2019-04-24 RX ORDER — ONDANSETRON 2 MG/ML
4 INJECTION INTRAMUSCULAR; INTRAVENOUS
Status: DISCONTINUED | OUTPATIENT
Start: 2019-04-24 | End: 2019-04-24 | Stop reason: HOSPADM

## 2019-04-24 NOTE — OR NURSING
Colonoscopy tolerated very well with sedation given. Patient watched the procedure and was comfortable.  1 polyp removed from the descending colon with a cold snare.

## 2019-04-24 NOTE — DISCHARGE INSTRUCTIONS
Discharge Instructions after Colonoscopy    Today you had a Colonoscopy    Activity and Diet  You were given medicine for pain. You may be dizzy or sleepy.  For 24 hours:    Do not drive or use heavy equipment.    Do not make important decisions.    Do not drink any alcohol.  You may return to your normal diet and medicines.    Discomfort    Air was placed in your colon during the exam in order to see it. Walking helps to pass the air.    You may take Tylenol (acetaminophen) for pain unless your doctor has told you not to.  Do not take aspirin or ibuprofen (Advil, Motrin, or other anti-inflammatory  drugs) for _____ days.    Follow-up  __x__ We took a small polyp to study. Your doctor will call you with the results  within two weeks.    When to call:    Call right away if you have:    Unusual pain in belly or chest pain not relieved with passing air.    More than 1 to 2 Tablespoons of bleeding from your rectum.    Fever above 100.6  F (37.5  C).    If you have severe pain, bleeding, or shortness of breath, go to an emergency room.    If you have questions, call:  Monday to Friday, 7 a.m. to 4:30 p.m.  Endoscopy: [phone] (We may have to call you back)    After hours  Hospital: [phone] (Ask for the GI fellow on call)

## 2019-04-25 LAB — COPATH REPORT: NORMAL

## 2019-04-29 LAB — COLONOSCOPY: NORMAL

## 2019-05-09 ENCOUNTER — DOCUMENTATION ONLY (OUTPATIENT)
Dept: SLEEP MEDICINE | Facility: CLINIC | Age: 29
End: 2019-05-09
Payer: COMMERCIAL

## 2019-05-09 NOTE — PROGRESS NOTES
30 DAY STM VISIT    Diagnostic AHI:  130 HST    Message left for patient to return call.    Assessment: Pt not meeting objective benchmarks for compliance.  Action plan: Waiting for patient to return call.   Patient has scheduled a follow up visit with Dr. Wu on 5/30/2019.   Device type: Bilevel  PAP settings:  17/11 cm H20  Mask type:  Full Face Mask  Objective measures: 14 day rolling measures      Compliance  35 %      Leak  22.69 lpm  last  upload      AHI 2.66   last  upload      Average number of minutes 136      Objective measure goal  Compliance   Goal >70%  Leak   Goal < 24 lpm  AHI  Goal < 5  Usage  Goal >240

## 2019-08-28 ENCOUNTER — DOCUMENTATION ONLY (OUTPATIENT)
Dept: SLEEP MEDICINE | Facility: CLINIC | Age: 29
End: 2019-08-28

## 2019-08-28 DIAGNOSIS — G47.33 OSA (OBSTRUCTIVE SLEEP APNEA): ICD-10-CM

## 2019-08-28 DIAGNOSIS — G47.34 HYPOXIA, SLEEP RELATED: ICD-10-CM

## 2019-08-28 NOTE — PROGRESS NOTES
A user error has taken place: encounter opened in error, closed for administrative reasons.

## 2019-10-07 ENCOUNTER — DOCUMENTATION ONLY (OUTPATIENT)
Dept: SLEEP MEDICINE | Facility: CLINIC | Age: 29
End: 2019-10-07

## 2019-10-07 DIAGNOSIS — G47.34 HYPOXIA, SLEEP RELATED: ICD-10-CM

## 2019-10-07 DIAGNOSIS — G47.33 OSA (OBSTRUCTIVE SLEEP APNEA): ICD-10-CM

## 2019-10-07 NOTE — PROGRESS NOTES
6 month [facility]    STM Recheck Visit     Diagnostic AHI:   130  HST    Message left for patient to return call     Assessment: Pt not meeting objective benchmarks for compliance     Action plan: waiting for patient to return call.   pt to follow up per provider request       Device type: Bilevel  PAP settings:  17/11 cm H20  Objective measures: 14 day rolling measures          Objective measure goal  Compliance   Goal >70%  Leak   Goal < 24 lpm  AHI  Goal < 5  Usage  Goal >240

## 2020-03-11 ENCOUNTER — HEALTH MAINTENANCE LETTER (OUTPATIENT)
Age: 30
End: 2020-03-11

## 2020-04-02 ENCOUNTER — DOCUMENTATION ONLY (OUTPATIENT)
Dept: SLEEP MEDICINE | Facility: CLINIC | Age: 30
End: 2020-04-02

## 2020-04-02 DIAGNOSIS — G47.33 OSA (OBSTRUCTIVE SLEEP APNEA): ICD-10-CM

## 2020-04-02 DIAGNOSIS — G47.34 HYPOXIA, SLEEP RELATED: ICD-10-CM

## 2020-04-02 NOTE — PROGRESS NOTES
Patient contacted regarding PAP usage that has either dropped off below an average of 20 minutes per night or device has stopped reporting into Epic or vendor site.    Diagnostic AHI: 130 HST    Patient still has device : Unknown    Last date device called in 08/02/2019    Last usage date 08/02/2019     Message left for patient to return call         Action plan:  voicemail left; waiting for return call from patient

## 2021-01-03 ENCOUNTER — HEALTH MAINTENANCE LETTER (OUTPATIENT)
Age: 31
End: 2021-01-03

## 2021-04-25 ENCOUNTER — HEALTH MAINTENANCE LETTER (OUTPATIENT)
Age: 31
End: 2021-04-25

## 2021-10-10 ENCOUNTER — HEALTH MAINTENANCE LETTER (OUTPATIENT)
Age: 31
End: 2021-10-10

## 2022-05-21 ENCOUNTER — HEALTH MAINTENANCE LETTER (OUTPATIENT)
Age: 32
End: 2022-05-21

## 2022-06-29 ENCOUNTER — OFFICE VISIT (OUTPATIENT)
Dept: URGENT CARE | Facility: URGENT CARE | Age: 32
End: 2022-06-29
Payer: COMMERCIAL

## 2022-06-29 VITALS
WEIGHT: 315 LBS | OXYGEN SATURATION: 98 % | DIASTOLIC BLOOD PRESSURE: 78 MMHG | HEIGHT: 74 IN | HEART RATE: 84 BPM | TEMPERATURE: 98.3 F | SYSTOLIC BLOOD PRESSURE: 129 MMHG | BODY MASS INDEX: 40.43 KG/M2

## 2022-06-29 DIAGNOSIS — H60.502 ACUTE OTITIS EXTERNA OF LEFT EAR, UNSPECIFIED TYPE: Primary | ICD-10-CM

## 2022-06-29 PROCEDURE — 99203 OFFICE O/P NEW LOW 30 MIN: CPT | Performed by: PHYSICIAN ASSISTANT

## 2022-06-29 RX ORDER — CIPROFLOXACIN AND DEXAMETHASONE 3; 1 MG/ML; MG/ML
4 SUSPENSION/ DROPS AURICULAR (OTIC) 2 TIMES DAILY
Qty: 2.8 ML | Refills: 0 | Status: SHIPPED | OUTPATIENT
Start: 2022-06-29 | End: 2022-07-06

## 2022-06-29 RX ORDER — TOPIRAMATE 25 MG/1
25 TABLET, FILM COATED ORAL 2 TIMES DAILY
COMMUNITY

## 2022-06-29 RX ORDER — FLUTICASONE PROPIONATE 50 MCG
SPRAY, SUSPENSION (ML) NASAL
COMMUNITY
Start: 2022-06-19

## 2022-06-29 ASSESSMENT — ENCOUNTER SYMPTOMS
SORE THROAT: 0
RHINORRHEA: 0
WHEEZING: 0
VOMITING: 0
COUGH: 0
MYALGIAS: 0
CHILLS: 0
NEUROLOGICAL NEGATIVE: 1
MUSCULOSKELETAL NEGATIVE: 1
HEADACHES: 0
DIARRHEA: 0
ABDOMINAL PAIN: 0
CARDIOVASCULAR NEGATIVE: 1
NAUSEA: 0
PALPITATIONS: 0
SHORTNESS OF BREATH: 0
CHEST TIGHTNESS: 0
FEVER: 0
GASTROINTESTINAL NEGATIVE: 1
ALLERGIC/IMMUNOLOGIC NEGATIVE: 1
CONSTITUTIONAL NEGATIVE: 1
RESPIRATORY NEGATIVE: 1
DYSURIA: 0
HEMATURIA: 0
SINUS PRESSURE: 0
SINUS PAIN: 0
FREQUENCY: 0

## 2022-06-29 NOTE — PROGRESS NOTES
"Chief Complaint:    Chief Complaint   Patient presents with     Otalgia     Pt said that started experiencing ear pain since he started using CPAP machine.     ASSESSMENT    Vital signs reviewed by Easton Youngblood PA-C  /78 (BP Location: Left arm, Patient Position: Sitting, Cuff Size: Adult Large)   Pulse 84   Temp 98.3  F (36.8  C) (Tympanic)   Ht 1.88 m (6' 2\")   Wt (!) 170.8 kg (376 lb 9.6 oz)   SpO2 98%   BMI 48.35 kg/m       1. Acute otitis externa of left ear, unspecified type         PLAN    Rx for Ciprodex for L otitis externa.  Fluids, vaporizer, acetaminophen, and or ibuprofen for pain.  Follow up with PCP if symptoms are not improving in 1 week. Sooner if symptoms worsen.   Worrisome symptoms discussed with instructions to go to the ED.  Patient verbalized understanding and agreed with this plan.     LABS:    No results found for any visits on 06/29/22.      Respiratory History  occasional episodes of bronchitis    Current Meds    Current Outpatient Medications:      ciprofloxacin-dexamethasone (CIPRODEX) 0.3-0.1 % otic suspension, Place 4 drops Into the left ear 2 times daily for 7 days, Disp: 2.8 mL, Rfl: 0     fluticasone (FLONASE) 50 MCG/ACT nasal spray, Spray 1-2 sprays into each nostrils once a day as needed, Disp: , Rfl:      topiramate (TOPAMAX) 25 MG tablet, Take 25 mg by mouth 2 times daily, Disp: , Rfl:     Problem history  Patient Active Problem List   Diagnosis     Morbid obesity (H)     CARDIOVASCULAR SCREENING; LDL GOAL LESS THAN 160     BRE (obstructive sleep apnea)     Hypoxia, sleep related       Allergies  No Known Allergies      SUBJECTIVE    HPI:Zach Hunter is an 32 year old male who presents for possible ear infection. Symptoms include ear pain on left. Onset 2 weeks, gradually worsening since that time. Ear history: few episodes of otitis.    Patient is eating and drinking well.  No fever, diarrhea or vomiting.  No cough, or wheezing.    ROS:    Review of Systems "   Constitutional: Negative.  Negative for chills and fever.   HENT: Positive for ear pain. Negative for ear discharge, rhinorrhea, sinus pressure, sinus pain and sore throat.    Respiratory: Negative.  Negative for cough, chest tightness, shortness of breath and wheezing.    Cardiovascular: Negative.  Negative for chest pain and palpitations.   Gastrointestinal: Negative.  Negative for abdominal pain, diarrhea, nausea and vomiting.   Genitourinary: Negative for dysuria, frequency, hematuria and urgency.   Musculoskeletal: Negative.  Negative for myalgias.   Skin: Negative for rash.   Allergic/Immunologic: Negative.  Negative for immunocompromised state.   Neurological: Negative.  Negative for headaches.        Family History   Family History   Problem Relation Age of Onset     Colon Cancer Father         Haven't been in contact since ~; unsure of current status        Social History  Social History     Socioeconomic History     Marital status: Single     Spouse name: Not on file     Number of children: Not on file     Years of education: Not on file     Highest education level: Not on file   Occupational History     Not on file   Tobacco Use     Smoking status: Former Smoker     Packs/day: 0.50     Years: 10.00     Pack years: 5.00     Types: Cigarettes, Cigars, Hookah     Start date: 2007     Quit date: 2018     Years since quittin.0     Smokeless tobacco: Never Used   Substance and Sexual Activity     Alcohol use: Yes     Comment: Casual, 1-2 per week with dinner     Drug use: Yes     Types: Marijuana     Comment: Daily     Sexual activity: Yes     Partners: Female     Birth control/protection: I.U.D.   Other Topics Concern     Parent/sibling w/ CABG, MI or angioplasty before 65F 55M? No   Social History Narrative     Not on file     Social Determinants of Health     Financial Resource Strain: Not on file   Food Insecurity: Not on file   Transportation Needs: Not on file   Physical Activity: Not on  "file   Stress: Not on file   Social Connections: Not on file   Intimate Partner Violence: Not on file   Housing Stability: Not on file        OBJECTIVE     Physical Exam:     Vital signs reviewed by Easton Youngblood PA-C  /78 (BP Location: Left arm, Patient Position: Sitting, Cuff Size: Adult Large)   Pulse 84   Temp 98.3  F (36.8  C) (Tympanic)   Ht 1.88 m (6' 2\")   Wt (!) 170.8 kg (376 lb 9.6 oz)   SpO2 98%   BMI 48.35 kg/m       Physical Exam:    Physical Exam  Vitals reviewed.   Constitutional:       General: He is not in acute distress.     Appearance: He is well-developed. He is not ill-appearing, toxic-appearing or diaphoretic.   HENT:      Head: Normocephalic and atraumatic.      Right Ear: Hearing, tympanic membrane, ear canal and external ear normal. No drainage, swelling or tenderness. Tympanic membrane is not perforated, erythematous, retracted or bulging.      Left Ear: Hearing, ear canal and external ear normal. Swelling and tenderness present. No drainage. Tympanic membrane is not perforated, erythematous, retracted or bulging.      Nose: Congestion present. No nasal tenderness, mucosal edema or rhinorrhea.      Right Turbinates: Not enlarged or swollen.      Left Turbinates: Not enlarged or swollen.      Right Sinus: No maxillary sinus tenderness or frontal sinus tenderness.      Left Sinus: No maxillary sinus tenderness or frontal sinus tenderness.      Mouth/Throat:      Pharynx: No pharyngeal swelling, oropharyngeal exudate, posterior oropharyngeal erythema or uvula swelling.      Tonsils: No tonsillar exudate. 0 on the right. 0 on the left.   Eyes:      General: Lids are normal.         Right eye: No discharge.         Left eye: No discharge.      Conjunctiva/sclera: Conjunctivae normal.      Right eye: Right conjunctiva is not injected. No exudate.     Left eye: Left conjunctiva is not injected. No exudate.     Pupils: Pupils are equal, round, and reactive to light.   Cardiovascular: "      Rate and Rhythm: Normal rate and regular rhythm.      Heart sounds: Normal heart sounds. No murmur heard.    No friction rub. No gallop.   Pulmonary:      Effort: Pulmonary effort is normal. No accessory muscle usage, respiratory distress or retractions.      Breath sounds: Normal breath sounds and air entry. No stridor, decreased air movement or transmitted upper airway sounds. No decreased breath sounds, wheezing, rhonchi or rales.   Chest:      Chest wall: No tenderness.   Abdominal:      General: Bowel sounds are normal. There is no distension.      Palpations: Abdomen is soft. Abdomen is not rigid. There is no mass.      Tenderness: There is no abdominal tenderness. There is no guarding or rebound.   Musculoskeletal:         General: Normal range of motion.      Cervical back: Normal range of motion and neck supple.   Lymphadenopathy:      Head:      Right side of head: No submental, submandibular, tonsillar, preauricular or posterior auricular adenopathy.      Left side of head: No submental, submandibular, tonsillar, preauricular or posterior auricular adenopathy.      Cervical:      Right cervical: No superficial or posterior cervical adenopathy.     Left cervical: No superficial or posterior cervical adenopathy.   Skin:     General: Skin is warm.      Capillary Refill: Capillary refill takes less than 2 seconds.   Neurological:      Mental Status: He is alert and oriented to person, place, and time.      Cranial Nerves: No cranial nerve deficit.      Sensory: No sensory deficit.      Motor: No abnormal muscle tone.      Coordination: Coordination normal.      Deep Tendon Reflexes: Reflexes normal.   Psychiatric:         Behavior: Behavior normal. Behavior is cooperative.         Thought Content: Thought content normal.         Judgment: Judgment normal.            Easton Youngblood PA-C  6/29/2022, 5:59 PM

## 2022-09-18 ENCOUNTER — HEALTH MAINTENANCE LETTER (OUTPATIENT)
Age: 32
End: 2022-09-18

## 2023-06-04 ENCOUNTER — HEALTH MAINTENANCE LETTER (OUTPATIENT)
Age: 33
End: 2023-06-04

## 2024-07-14 ENCOUNTER — HEALTH MAINTENANCE LETTER (OUTPATIENT)
Age: 34
End: 2024-07-14

## (undated) RX ORDER — GLYCOPYRROLATE 0.2 MG/ML
INJECTION, SOLUTION INTRAMUSCULAR; INTRAVENOUS
Status: DISPENSED
Start: 2019-04-23

## (undated) RX ORDER — FENTANYL CITRATE 50 UG/ML
INJECTION, SOLUTION INTRAMUSCULAR; INTRAVENOUS
Status: DISPENSED
Start: 2019-04-24

## (undated) RX ORDER — PROPOFOL 10 MG/ML
INJECTION, EMULSION INTRAVENOUS
Status: DISPENSED
Start: 2019-04-23

## (undated) RX ORDER — LIDOCAINE HYDROCHLORIDE 20 MG/ML
INJECTION, SOLUTION EPIDURAL; INFILTRATION; INTRACAUDAL; PERINEURAL
Status: DISPENSED
Start: 2019-04-23